# Patient Record
Sex: FEMALE | Race: WHITE | NOT HISPANIC OR LATINO | URBAN - METROPOLITAN AREA
[De-identification: names, ages, dates, MRNs, and addresses within clinical notes are randomized per-mention and may not be internally consistent; named-entity substitution may affect disease eponyms.]

---

## 2018-09-17 RX ORDER — AMOXICILLIN 250 MG/5ML
1 SUSPENSION, RECONSTITUTED, ORAL (ML) ORAL
Qty: 0 | Refills: 0 | COMMUNITY
Start: 2018-09-17 | End: 2018-09-27

## 2018-09-19 ENCOUNTER — INPATIENT (INPATIENT)
Facility: HOSPITAL | Age: 43
LOS: 1 days | Discharge: HOME | DRG: 103 | End: 2018-09-21
Attending: INTERNAL MEDICINE | Admitting: HOSPITALIST
Payer: COMMERCIAL

## 2018-09-19 VITALS — WEIGHT: 134.92 LBS | HEIGHT: 64 IN

## 2018-09-19 DIAGNOSIS — Z98.89 OTHER SPECIFIED POSTPROCEDURAL STATES: Chronic | ICD-10-CM

## 2018-09-19 LAB
ALBUMIN SERPL ELPH-MCNC: 5.2 G/DL — SIGNIFICANT CHANGE UP (ref 3.3–5.2)
ALP SERPL-CCNC: 44 U/L — SIGNIFICANT CHANGE UP (ref 40–120)
ALT FLD-CCNC: 11 U/L — SIGNIFICANT CHANGE UP
ANION GAP SERPL CALC-SCNC: 17 MMOL/L — SIGNIFICANT CHANGE UP (ref 5–17)
AST SERPL-CCNC: 16 U/L — SIGNIFICANT CHANGE UP
BASOPHILS # BLD AUTO: 0 K/UL — SIGNIFICANT CHANGE UP (ref 0–0.2)
BASOPHILS NFR BLD AUTO: 0.5 % — SIGNIFICANT CHANGE UP (ref 0–2)
BILIRUB SERPL-MCNC: 0.4 MG/DL — SIGNIFICANT CHANGE UP (ref 0.4–2)
BUN SERPL-MCNC: 17 MG/DL — SIGNIFICANT CHANGE UP (ref 8–20)
CALCIUM SERPL-MCNC: 10.4 MG/DL — HIGH (ref 8.6–10.2)
CHLORIDE SERPL-SCNC: 100 MMOL/L — SIGNIFICANT CHANGE UP (ref 98–107)
CO2 SERPL-SCNC: 24 MMOL/L — SIGNIFICANT CHANGE UP (ref 22–29)
CREAT SERPL-MCNC: 0.68 MG/DL — SIGNIFICANT CHANGE UP (ref 0.5–1.3)
EOSINOPHIL # BLD AUTO: 0 K/UL — SIGNIFICANT CHANGE UP (ref 0–0.5)
EOSINOPHIL NFR BLD AUTO: 0.7 % — SIGNIFICANT CHANGE UP (ref 0–6)
GLUCOSE SERPL-MCNC: 93 MG/DL — SIGNIFICANT CHANGE UP (ref 70–115)
HCT VFR BLD CALC: 42.3 % — SIGNIFICANT CHANGE UP (ref 37–47)
HGB BLD-MCNC: 14.1 G/DL — SIGNIFICANT CHANGE UP (ref 12–16)
LYMPHOCYTES # BLD AUTO: 2.2 K/UL — SIGNIFICANT CHANGE UP (ref 1–4.8)
LYMPHOCYTES # BLD AUTO: 29.9 % — SIGNIFICANT CHANGE UP (ref 20–55)
MCHC RBC-ENTMCNC: 28.9 PG — SIGNIFICANT CHANGE UP (ref 27–31)
MCHC RBC-ENTMCNC: 33.3 G/DL — SIGNIFICANT CHANGE UP (ref 32–36)
MCV RBC AUTO: 86.7 FL — SIGNIFICANT CHANGE UP (ref 81–99)
MONOCYTES # BLD AUTO: 0.5 K/UL — SIGNIFICANT CHANGE UP (ref 0–0.8)
MONOCYTES NFR BLD AUTO: 6.2 % — SIGNIFICANT CHANGE UP (ref 3–10)
NEUTROPHILS # BLD AUTO: 4.7 K/UL — SIGNIFICANT CHANGE UP (ref 1.8–8)
NEUTROPHILS NFR BLD AUTO: 62.6 % — SIGNIFICANT CHANGE UP (ref 37–73)
PLATELET # BLD AUTO: 311 K/UL — SIGNIFICANT CHANGE UP (ref 150–400)
POTASSIUM SERPL-MCNC: 3.7 MMOL/L — SIGNIFICANT CHANGE UP (ref 3.5–5.3)
POTASSIUM SERPL-SCNC: 3.7 MMOL/L — SIGNIFICANT CHANGE UP (ref 3.5–5.3)
PROT SERPL-MCNC: 8.4 G/DL — SIGNIFICANT CHANGE UP (ref 6.6–8.7)
RBC # BLD: 4.88 M/UL — SIGNIFICANT CHANGE UP (ref 4.4–5.2)
RBC # FLD: 13.5 % — SIGNIFICANT CHANGE UP (ref 11–15.6)
SODIUM SERPL-SCNC: 141 MMOL/L — SIGNIFICANT CHANGE UP (ref 135–145)
TSH SERPL-MCNC: 2.85 UIU/ML — SIGNIFICANT CHANGE UP (ref 0.27–4.2)
WBC # BLD: 7.4 K/UL — SIGNIFICANT CHANGE UP (ref 4.8–10.8)
WBC # FLD AUTO: 7.4 K/UL — SIGNIFICANT CHANGE UP (ref 4.8–10.8)

## 2018-09-19 PROCEDURE — 99285 EMERGENCY DEPT VISIT HI MDM: CPT

## 2018-09-19 PROCEDURE — 70551 MRI BRAIN STEM W/O DYE: CPT | Mod: 26

## 2018-09-19 PROCEDURE — 70544 MR ANGIOGRAPHY HEAD W/O DYE: CPT | Mod: 26,59

## 2018-09-19 PROCEDURE — 70450 CT HEAD/BRAIN W/O DYE: CPT | Mod: 26

## 2018-09-19 PROCEDURE — 70547 MR ANGIOGRAPHY NECK W/O DYE: CPT | Mod: 26

## 2018-09-19 PROCEDURE — 99222 1ST HOSP IP/OBS MODERATE 55: CPT | Mod: GC

## 2018-09-19 PROCEDURE — 93010 ELECTROCARDIOGRAM REPORT: CPT

## 2018-09-19 RX ORDER — SODIUM CHLORIDE 9 MG/ML
1000 INJECTION, SOLUTION INTRAVENOUS ONCE
Qty: 0 | Refills: 0 | Status: COMPLETED | OUTPATIENT
Start: 2018-09-19 | End: 2018-09-19

## 2018-09-19 RX ORDER — SACCHAROMYCES BOULARDII 250 MG
250 POWDER IN PACKET (EA) ORAL
Qty: 0 | Refills: 0 | Status: DISCONTINUED | OUTPATIENT
Start: 2018-09-19 | End: 2018-09-20

## 2018-09-19 RX ORDER — ASPIRIN/CALCIUM CARB/MAGNESIUM 324 MG
81 TABLET ORAL DAILY
Qty: 0 | Refills: 0 | Status: DISCONTINUED | OUTPATIENT
Start: 2018-09-19 | End: 2018-09-20

## 2018-09-19 RX ADMIN — SODIUM CHLORIDE 3000 MILLILITER(S): 9 INJECTION, SOLUTION INTRAVENOUS at 17:27

## 2018-09-19 RX ADMIN — Medication 81 MILLIGRAM(S): at 22:34

## 2018-09-19 NOTE — ED ADULT NURSE REASSESSMENT NOTE - NS ED NURSE REASSESS COMMENT FT1
report given to sandeep salazar in holding room
Pt resting comfortably on stretcher.  No complaints of pain. Pt. states she is still experiencing numbness along the left leg but all other complaints of numbness have almost fully resolved.  Respirations even and unlabored.  Awaiting MRI results.  PIV wnl; flushing without difficulty.  In NAD, will continue to monitor.

## 2018-09-19 NOTE — H&P ADULT - NSHPLABSRESULTS_GEN_ALL_CORE
< from: CT Head No Cont (09.19.18 @ 17:52) >  FINDINGS:  There is no extra-axial intra-axial collection.  There is no evidence for acute infarct, acute hemorrhage, mass effect, or   hydrocephalus.  The gray matter white matter junction is well-preserved.  The visualized portions of the paranasal sinuses and mastoid air cells   are clear.  IMPRESSION:  No acute intracranial pathology. If symptoms persist, consider brain MRI   follow-up if there are no MRI contraindications.  < end of copied text >      < from: MR Angio Head No Cont (09.19.18 @ 23:37) >  IMPRESSION:         Normal head/brain MRA.  ******PRELIMINARY REPORT******    ******PRELIMINARY REPORT******        < end of copied text >      < from: MR Head No Cont (09.19.18 @ 20:15) >  IMPRESSION:   Several small high T2 and flair signal lesions in the deep and   subcortical   white matter of both hemispheres which are nonspecific. Possible   etiologies   include changes related to vasculitis or migraine headache, lyme disease,   early   small vessel ischemic changes or foci of demyelination. Correlate   clinically.  ******PRELIMINARY REPORT******      < end of copied text >

## 2018-09-19 NOTE — ED STATDOCS - OBJECTIVE STATEMENT
Patient is a 43 year old F with a PMHx of vertigo and migraines who presents to the ED complaining of L sided facial numbness that started today.  Also reports she has had on/off left leg numbness for a few years and numbness from the L elbow down, worse on ulnar aspect, for about a week.  Notes that she has history bursitis and sciatica, so the leg numbness is normal.  Also notes she has had L arm numbness in the past.  States that she has seen a neurologist for her migraines and vertigo, but not for her numbness.  Of note, patient was dx with a sinus infection within the last week and is currently on antibiotics.  States that she has a history of developing migraines when she has infections.  Denies any weakness to arms or legs, slurred speech, facial droop, abnormal HAs, dizziness or other complaints.  Denies f/c/n/v/cp/sob/palpitations/cough/rash/abd.pain/d/c/dysuria/hematuria.  No recent travel or long car/plane rides. Patient is a 43 year old F with a PMHx of vertigo and migraines who presents to the ED complaining of L sided facial numbness that started today.  Also reports she has had on/off left leg numbness for a few years and numbness from the L elbow down, worse on ulnar aspect, for about a week.  Notes that she has history bursitis and sciatica.  Also notes she has had L arm numbness in the past.  States that she has seen a neurologist for her migraines and vertigo, but not for her numbness.  Of note, patient was dx with a sinus infection within the last week and is currently on antibiotics.  States that she has a history of developing migraines when she has infections.  Denies any weakness to arms or legs, slurred speech, facial droop, abnormal HAs, dizziness or other complaints.  Denies f/c/n/v/cp/sob/palpitations/cough/rash/abd.pain/d/c/dysuria/hematuria.  No recent travel or long car/plane rides.

## 2018-09-19 NOTE — H&P ADULT - FAMILY HISTORY
Grandparent  Still living? Unknown  Family history of stroke, Age at diagnosis: Age Unknown     Mother  Still living? Unknown  Family history of ovarian cancer, Age at diagnosis: Age Unknown     Father  Still living? Unknown  Family history of testicular cancer, Age at diagnosis: Age Unknown  Family history of pancreatic cancer, Age at diagnosis: Age Unknown

## 2018-09-19 NOTE — ED STATDOCS - PROGRESS NOTE DETAILS
MRI nonspecific spoke to Dr. Zuluaga neurology will admit for MRA head and neck neuro to see pt in the am

## 2018-09-19 NOTE — ED STATDOCS - NEUROLOGICAL, MLM
sensation is normal and strength is normal. CN 2-12 intact, strength 5/5 upper and lower extremities.  finger to nose intact

## 2018-09-19 NOTE — ED ADULT NURSE NOTE - NSIMPLEMENTINTERV_GEN_ALL_ED
Implemented All Universal Safety Interventions:  [employer] [facility] to call system. Call bell, personal items and telephone within reach. Instruct patient to call for assistance. Room bathroom lighting operational. Non-slip footwear when patient is off stretcher. Physically safe environment: no spills, clutter or unnecessary equipment. Stretcher in lowest position, wheels locked, appropriate side rails in place.

## 2018-09-19 NOTE — H&P ADULT - NSHPREVIEWOFSYSTEMS_GEN_ALL_CORE
Denies fevers, chills, nausea, vomiting, headaches, chest pain, sob, palpitations, abdominal pain, diarrhea, dysuria, rashes.

## 2018-09-19 NOTE — ED STATDOCS - MEDICAL DECISION MAKING DETAILS
Possible atypical migraine, in the setting of uri.  Patient states she normally gets migraines with uri.  Will follow labs, CT and MRI of brain.  If negative, will d/c with neurology follow up. Possible atypical migraine, in the setting of uri vs MS.  Patient states she normally gets migraines with uri but no ha this time.  Will follow labs, CT and MRI of brain.  If negative, will d/c with neurology follow up.

## 2018-09-19 NOTE — H&P ADULT - NSHPPHYSICALEXAM_GEN_ALL_CORE
Vital Signs Last 24 Hrs  T(C): 37 (20 Sep 2018 00:43), Max: 37.2 (19 Sep 2018 21:41)  T(F): 98.6 (20 Sep 2018 00:43), Max: 99 (19 Sep 2018 21:41)  HR: 88 (20 Sep 2018 00:43) (88 - 112)  BP: 137/78 (20 Sep 2018 00:43) (137/78 - 163/94)  RR: 16 (20 Sep 2018 00:43) (16 - 16)  SpO2: 100% (20 Sep 2018 00:43) (100% - 100%)    General: NAD, resting comfortably in bed  HEENT: NC/AT, PERRLA 3-4mm b/l, EOMI, throat non-erythematous, MMM, no sinus tenderness  Neck: supple, no lymphadenopathy  Resp: CTA bilaterally, no crackles or wheezes  Cardio: S1S2+, RRR, no murmurs  Abdomen: soft, non-distended, BS+ normoactive, non-tender  Vascular: no peripheral edema, DP pulses 2+ b/l  MSK: FROM in all extremities  Neuro: AAOx3, CN 2-12 grossly intact. Sensation grossly intact in all extremities, 5/5 strength in all extremities  Skin: warm and dry, normal color  Psych: normal mood and affect, pleasant, cooperative

## 2018-09-19 NOTE — H&P ADULT - ASSESSMENT
44 y/o female with hx of left sided sciatica, migraines and vertigo, presents with complaints of left facial numbness since yesterday and left arm numbness x 1 week. 46 y/o female with hx of left sided sciatica, migraines w/o aura, and vertigo, presents with complaints of left facial numbness since yesterday and left arm numbness x 1 week.     Left facial numbness  -unknown etiology  -MRI head findings with several small high T2 and flair signal lesions in the deep and subcortical white matter of both hemispheres, concerning for demyelination process vs migraine headaches vs lyme vs vasculitis  -lyme studies  -neuro consult  -possible LP to assess csf oligoclonal bands    Sinusitis  -c/w amoxicillin     Preventive measure  -vcd boots  -low risk pt not needing pharmacologic AC at this time 46 y/o female with hx of left sided sciatica, migraines w/o aura, and vertigo, presents with complaints of left facial numbness since yesterday and left arm numbness x 1 week.     Left facial numbness  -unknown etiology  -MRI head findings with several small high T2 and flair signal lesions in the deep and subcortical white matter of both hemispheres, concerning for demyelination process vs migraine headaches vs lyme vs vasculitis  -MRA head and neck prelim negative  -lyme studies  -neuro consult  -possible LP to assess csf oligoclonal bands    Preventive measure  -vcd boots  -low risk pt not needing pharmacologic AC at this time 46 y/o female with hx of left sided sciatica, migraines w/o aura, and vertigo, presents with complaints of left facial numbness since yesterday and left arm numbness x 1 week.     Left facial numbness  -unknown etiology  -MRI head findings with several small high T2 and flair signal lesions in the deep and subcortical white matter of both hemispheres, concerning for demyelination process vs migraine headaches vs lyme vs vasculitis  -MRA head and neck prelim negative  -lyme studies  -neuro consult  -possible LP to assess csf oligoclonal bands    Sinusitis  -was given amoxicillin 2 days ago for sinusitis that was diagnosed at an urgent care  -currently asymptomatic and imaging studies negative, so will d/c abx    Preventive measure  -vcd boots  -low risk pt not needing pharmacologic AC at this time 46 y/o female with hx of left sided sciatica, migraines w/o aura, and vertigo, presents with complaints of left facial numbness since yesterday and left arm numbness x 1 week.     Left facial numbness  -unknown etiology  -MRI head findings with several small high T2 and flair signal lesions in the deep and subcortical white matter of both hemispheres, concerning for demyelination process vs migraine headaches vs lyme vs vasculitis  -MRA head and neck prelim negative  -f/u lyme studies and CATHI  -neuro consult  -possible LP to assess csf oligoclonal bands    Sinusitis  -was given amoxicillin 2 days ago for sinusitis that was diagnosed at an urgent care  -currently asymptomatic and imaging studies negative, so will d/c abx    Preventive measure  -vcd boots  -low risk pt not needing pharmacologic AC at this time

## 2018-09-19 NOTE — H&P ADULT - HISTORY OF PRESENT ILLNESS
Patient is a 46 yo F with a pmh of sciatica, migraines and vertigowho presented to ED c/o left facial numbness that started this  morning. Patient reports intermittent numbness of left upper and lower extremities over the past 1 week.....    Patient was diagnosed with sinusitis on Monday for which she has been taking amoxicillin. 44 y/o female with hx of left sided sciatica, migraines and vertigo, presents with complaints of left facial numbness since yesterday. Also reports left arm numbness for the past week. Numbness is constant, but severity waxes and wanes. No exacerbating or relieving factors noted. Denies speech impairment, difficulties swallowing, facial droop, motor deficits, vision changes or headaches. No prior events. No sick contacts, recent travel, camping, tick bites or rashes.     Patient was diagnosed with sinusitis on Monday at urgent care center for which she has been taking amoxicillin. 46 y/o female with hx of left sided sciatica, migraines and vertigo, presents with complaints of left facial numbness since yesterday. Also reports left arm numbness for the past week. Numbness is constant, but severity waxes and wanes. No exacerbating or relieving factors noted. She also has intermittent left leg numbness from her Sciatica for the past 10 years. Denies speech impairment, difficulties swallowing, facial droop, motor deficits, vision changes or headaches. No prior events. No sick contacts, recent travel, camping, tick bites or rashes.     Patient was diagnosed with sinusitis on Monday at urgent care center for which she has been taking amoxicillin. 44 y/o female with hx of left sided sciatica, migraines w/o aura, and vertigo, presents with complaints of left facial numbness since yesterday. Also reports left arm numbness for the past week. Numbness is constant, but severity waxes and wanes. No exacerbating or relieving factors noted. She also has intermittent left leg numbness from her Sciatica for the past 10 years. Denies speech impairment, difficulties swallowing, facial droop, motor deficits, vision changes or headaches. No prior events. No sick contacts, recent travel, camping, tick bites or rashes.     Patient was diagnosed with sinusitis on Monday at urgent care center for which she has been taking amoxicillin. 46 y/o female with hx of left sided sciatica, migraines w/o aura, and vertigo, presents with complaints of left facial numbness since yesterday. Also reports left arm numbness for the past week. Numbness is constant, but severity waxes and wanes. No exacerbating or relieving factors noted. She also has intermittent left leg numbness from her Sciatica for the past 10 years. Denies speech impairment, difficulties swallowing, facial droop, motor deficits, vision changes or headaches. No prior events. No sick contacts, recent travel, camping, tick bites or rashes. No family hx of lupus or other autoimmune diseases.    Patient was diagnosed with sinusitis on Monday at urgent care center for which she has been taking amoxicillin.

## 2018-09-19 NOTE — ED ADULT NURSE NOTE - OBJECTIVE STATEMENT
44 y/o female presents with paresthesia to the left ear, arm, and leg x 1 week. Pt. has olga of vertigo and allergies to Bactrim. Pt. denies any weakness, HA, change in vision, fever, chills, N/V.

## 2018-09-20 DIAGNOSIS — R20.0 ANESTHESIA OF SKIN: ICD-10-CM

## 2018-09-20 LAB
ANION GAP SERPL CALC-SCNC: 14 MMOL/L — SIGNIFICANT CHANGE UP (ref 5–17)
BUN SERPL-MCNC: 13 MG/DL — SIGNIFICANT CHANGE UP (ref 8–20)
CALCIUM SERPL-MCNC: 9.6 MG/DL — SIGNIFICANT CHANGE UP (ref 8.6–10.2)
CHLORIDE SERPL-SCNC: 104 MMOL/L — SIGNIFICANT CHANGE UP (ref 98–107)
CO2 SERPL-SCNC: 24 MMOL/L — SIGNIFICANT CHANGE UP (ref 22–29)
CREAT SERPL-MCNC: 0.73 MG/DL — SIGNIFICANT CHANGE UP (ref 0.5–1.3)
GLUCOSE SERPL-MCNC: 93 MG/DL — SIGNIFICANT CHANGE UP (ref 70–115)
POTASSIUM SERPL-MCNC: 3.9 MMOL/L — SIGNIFICANT CHANGE UP (ref 3.5–5.3)
POTASSIUM SERPL-SCNC: 3.9 MMOL/L — SIGNIFICANT CHANGE UP (ref 3.5–5.3)
SODIUM SERPL-SCNC: 142 MMOL/L — SIGNIFICANT CHANGE UP (ref 135–145)

## 2018-09-20 PROCEDURE — 99233 SBSQ HOSP IP/OBS HIGH 50: CPT | Mod: GC

## 2018-09-20 PROCEDURE — 93306 TTE W/DOPPLER COMPLETE: CPT | Mod: 26

## 2018-09-20 RX ORDER — ACETAMINOPHEN 500 MG
650 TABLET ORAL EVERY 6 HOURS
Qty: 0 | Refills: 0 | Status: DISCONTINUED | OUTPATIENT
Start: 2018-09-20 | End: 2018-09-21

## 2018-09-20 RX ORDER — ASPIRIN/CALCIUM CARB/MAGNESIUM 324 MG
81 TABLET ORAL DAILY
Qty: 0 | Refills: 0 | Status: DISCONTINUED | OUTPATIENT
Start: 2018-09-20 | End: 2018-09-21

## 2018-09-20 RX ADMIN — Medication 650 MILLIGRAM(S): at 08:30

## 2018-09-20 RX ADMIN — Medication 650 MILLIGRAM(S): at 20:02

## 2018-09-20 RX ADMIN — Medication 650 MILLIGRAM(S): at 07:43

## 2018-09-20 RX ADMIN — Medication 650 MILLIGRAM(S): at 19:32

## 2018-09-20 NOTE — PROGRESS NOTE ADULT - ASSESSMENT
46 y/o female with hx of left sided sciatica, migraines w/o aura, and vertigo, presents with complaints of left facial numbness since yesterday and left arm numbness x 1 week.     Left facial numbness  -unknown etiology  -MRI head findings with several small high T2 and flair signal lesions in the deep and subcortical white matter of both hemispheres, concerning for demyelination process vs migraine headaches vs lyme vs vasculitis  -MRA head and neck prelim negative  -f/u lyme studies and CATHI  -neuro consult  -possible LP to assess csf oligoclonal bands    Sinusitis  -was given amoxicillin 2 days ago for sinusitis that was diagnosed at an urgent care  -currently asymptomatic and imaging studies negative, so will d/c abx    Preventive measure  -vcd boots  -low risk pt not needing pharmacologic AC at this time

## 2018-09-20 NOTE — PROGRESS NOTE ADULT - SUBJECTIVE AND OBJECTIVE BOX
Patient is a 43y old  Female who presents with a chief complaint of Left-sided facial numbness (19 Sep 2018 23:12)    Overnight/AM Events:  Patient seen and examined at bedside. No acute overnight events. She reports persistent left sided facial and upper extremity numbness, but reduced in intensity. No motor or speech deficits. She also complains of a mild frontal headache.     ROS: Denies fevers, chills, nausea, vomiting, chest pain, sob, palpitations, abdominal pain, diarrhea, dysuria. All other ROS negative.     Vital Signs Last 24 Hrs  T(C): 36.8 (20 Sep 2018 05:04), Max: 37.2 (19 Sep 2018 21:41)  T(F): 98.2 (20 Sep 2018 05:04), Max: 99 (19 Sep 2018 21:41)  HR: 87 (20 Sep 2018 05:04) (87 - 112)  BP: 131/78 (20 Sep 2018 05:04) (131/78 - 163/94)  RR: 16 (20 Sep 2018 05:04) (16 - 16)  SpO2: 99% (20 Sep 2018 05:04) (99% - 100%)    Physical Exam:  General: NAD, resting comfortably in bed  HEENT: NC/AT, PERRLA 3-4mm b/l, EOMI, throat non-erythematous, MMM, no sinus tenderness  Neck: supple, no lymphadenopathy  Resp: CTA bilaterally, no crackles or wheezes  Cardio: S1S2+, RRR, no murmurs  Abdomen: soft, non-distended, BS+ normoactive, non-tender  Vascular: no peripheral edema, DP pulses 2+ b/l  MSK: FROM in all extremities  Neuro: AAOx3, CN 2-12 grossly intact. Sensation grossly intact in all extremities, 5/5 strength in all extremities  Skin: warm and dry, normal color  Psych: normal mood and affect, pleasant, cooperative    Labs:                        14.1   7.4   )-----------( 311      ( 19 Sep 2018 17:47 )             42.3   09-19    141  |  100  |  17.0  ----------------------------<  93  3.7   |  24.0  |  0.68    Ca    10.4<H>      19 Sep 2018 17:47    TPro  8.4  /  Alb  5.2  /  TBili  0.4  /  DBili  x   /  AST  16  /  ALT  11  /  AlkPhos  44  09-19      MEDICATIONS  (STANDING):    MEDICATIONS  (PRN):  acetaminophen   Tablet .. 650 milliGRAM(s) Oral every 6 hours PRN Mild Pain (1 - 3)

## 2018-09-20 NOTE — CONSULT NOTE ADULT - SUBJECTIVE AND OBJECTIVE BOX
CHIEF COMPLAINT: left face numb    HPI: 43yFemale  46 y/o female with hx of left sided sciatica, migraines w/o aura, and vertigo, presents with complaints of left facial numbness since yesterday. Also reports left arm numbness for the past week. Numbness is constant, but severity waxes and wanes. No exacerbating or relieving factors noted. She also has intermittent left leg numbness from her Sciatica for the past 10 years. Denies speech impairment, difficulties swallowing, facial droop, motor deficits, vision changes or headaches. No prior events. No sick contacts, recent travel, camping, tick bites or rashes. No family hx of lupus or other autoimmune diseases.    Patient was diagnosed with sinusitis on Monday at urgent care center for which she has been taking amoxicillin.     This morning the only numbness ( and Pain) is behind the leg- chronic sciatica.  No h/o miscarriage, PE or DVT.    PAST MEDICAL & SURGICAL HISTORY:  Migraines  Endometriosis  Vertigo  S/P  section: x3  S/P laparoscopic surgery: Endometriosis    MEDICATIONS  (STANDING):    MEDICATIONS  (PRN):  acetaminophen   Tablet .. 650 milliGRAM(s) Oral every 6 hours PRN Mild Pain (1 - 3)    Allergies    Bactrim (Rash)    Intolerances        FAMILY HISTORY:  Family history of pancreatic cancer (Father)  Family history of testicular cancer (Father)  Family history of ovarian cancer (Mother)  Family history of stroke (Grandparent): stroke at age &gt;70yrs  Migraine  Grandmother had PE        SOCIAL HISTORY:    Tobacco:  no  Alcohol:  no  Drugs:  no        REVIEW OF SYSTEMS:    Relevant systems are negative except as noted in the chart, HPI, and PMH      VITAL SIGNS:  Vital Signs Last 24 Hrs  T(C): 37.4 (20 Sep 2018 08:13), Max: 37.4 (20 Sep 2018 08:13)  T(F): 99.3 (20 Sep 2018 08:13), Max: 99.3 (20 Sep 2018 08:13)  HR: 83 (20 Sep 2018 08:13) (83 - 112)  BP: 128/82 (20 Sep 2018 08:13) (128/82 - 163/94)  BP(mean): --  RR: 17 (20 Sep 2018 08:13) (16 - 17)  SpO2: 99% (20 Sep 2018 08:13) (99% - 100%)    PHYSICAL EXAMINATION:    General: Well-developed, well nourished, in no acute distress.  Cardiac:  Regular rate and rhythm. No carotid bruits appreciated.  Eyes: Fundoscopic examination was deferred.  Neurologic:  - Mental Status:  Alert, awake, oriented to person, place, and time; Speech is fluent. Language is normal. Follows commands well.  Insight and knowledge appear appropriate.  Cranial Nerves II-XII:    II:  Visual acuity is normal for age ; Visual fields are full to confrontation; Pupils are equal, round, and reactive to light.  III, IV, VI:  Extraocular movements are intact without nystagmus.  V:  Facial sensation is intact in the V1-V3 distribution bilaterally.  VII:  Face is symmetric with normal eye closure and smile  VIII:  Hearing is grossly intact  IX, X, XII:  speech is clear  XI:  Head turning and shoulder shrug are intact.  - Motor:  Strength is 5/5 x 4.   There is no pronator drift. .  - Reflexes:  2+ and symmetric at the knees.  Plantar responses flexor.  - Sensory:  Symmetric to light touch  - Coordination:  Finger-nose-finger is normal. Rapid alternating hand and foot  movements are intact. Dexterity appears normal      LABS:                          14.1   7.4   )-----------( 311      ( 19 Sep 2018 17:47 )             42.3     20 Sep 2018 06:38    142    |  104    |  13.0   ----------------------------<  93     3.9     |  24.0   |  0.73     Ca    9.6        20 Sep 2018 06:38    TPro  8.4    /  Alb  5.2    /  TBili  0.4    /  DBili  x      /  AST  16     /  ALT  11     /  AlkPhos  44     19 Sep 2018 17:47    LIVER FUNCTIONS - ( 19 Sep 2018 17:47 )  Alb: 5.2 g/dL / Pro: 8.4 g/dL / ALK PHOS: 44 U/L / ALT: 11 U/L / AST: 16 U/L / GGT: x                 RADIOLOGY & ADDITIONAL STUDIES:    MR brain - mild microvascular changes  MRA head and neck - normal    IMPRESSION:    Suspect migraine., No obvious risk factors for stroke/TIA, Seizure. May be subjective symptom from sunus disease.  Vascular studies are negative.       PLAN:  1. Medical and Cardiac evaluation and treatment as indicated- Needs TTE and probably JETT  2. Hypercoag work up -can be done as outpatient  3. ASA 81mg  4.  5.

## 2018-09-21 ENCOUNTER — TRANSCRIPTION ENCOUNTER (OUTPATIENT)
Age: 43
End: 2018-09-21

## 2018-09-21 VITALS
TEMPERATURE: 98 F | HEART RATE: 76 BPM | OXYGEN SATURATION: 99 % | DIASTOLIC BLOOD PRESSURE: 74 MMHG | SYSTOLIC BLOOD PRESSURE: 119 MMHG

## 2018-09-21 LAB — ANA TITR SER: NEGATIVE — SIGNIFICANT CHANGE UP

## 2018-09-21 PROCEDURE — 70544 MR ANGIOGRAPHY HEAD W/O DYE: CPT

## 2018-09-21 PROCEDURE — 86618 LYME DISEASE ANTIBODY: CPT

## 2018-09-21 PROCEDURE — 70547 MR ANGIOGRAPHY NECK W/O DYE: CPT

## 2018-09-21 PROCEDURE — 93970 EXTREMITY STUDY: CPT

## 2018-09-21 PROCEDURE — 80053 COMPREHEN METABOLIC PANEL: CPT

## 2018-09-21 PROCEDURE — 93325 DOPPLER ECHO COLOR FLOW MAPG: CPT

## 2018-09-21 PROCEDURE — 85027 COMPLETE CBC AUTOMATED: CPT

## 2018-09-21 PROCEDURE — 93306 TTE W/DOPPLER COMPLETE: CPT

## 2018-09-21 PROCEDURE — 99239 HOSP IP/OBS DSCHRG MGMT >30: CPT

## 2018-09-21 PROCEDURE — 93312 ECHO TRANSESOPHAGEAL: CPT

## 2018-09-21 PROCEDURE — 70450 CT HEAD/BRAIN W/O DYE: CPT

## 2018-09-21 PROCEDURE — 93320 DOPPLER ECHO COMPLETE: CPT

## 2018-09-21 PROCEDURE — 84443 ASSAY THYROID STIM HORMONE: CPT

## 2018-09-21 PROCEDURE — 86038 ANTINUCLEAR ANTIBODIES: CPT

## 2018-09-21 PROCEDURE — 36415 COLL VENOUS BLD VENIPUNCTURE: CPT

## 2018-09-21 PROCEDURE — 84702 CHORIONIC GONADOTROPIN TEST: CPT

## 2018-09-21 PROCEDURE — 80048 BASIC METABOLIC PNL TOTAL CA: CPT

## 2018-09-21 PROCEDURE — 93970 EXTREMITY STUDY: CPT | Mod: 26

## 2018-09-21 PROCEDURE — 93005 ELECTROCARDIOGRAM TRACING: CPT

## 2018-09-21 PROCEDURE — 99285 EMERGENCY DEPT VISIT HI MDM: CPT

## 2018-09-21 PROCEDURE — 70551 MRI BRAIN STEM W/O DYE: CPT

## 2018-09-21 RX ORDER — ASPIRIN/CALCIUM CARB/MAGNESIUM 324 MG
1 TABLET ORAL
Qty: 30 | Refills: 0 | OUTPATIENT
Start: 2018-09-21 | End: 2018-10-20

## 2018-09-21 RX ORDER — ASPIRIN/CALCIUM CARB/MAGNESIUM 324 MG
1 TABLET ORAL
Qty: 30 | Refills: 0
Start: 2018-09-21 | End: 2018-10-20

## 2018-09-21 RX ORDER — ACETAMINOPHEN 500 MG
2 TABLET ORAL
Qty: 0 | Refills: 0 | DISCHARGE
Start: 2018-09-21

## 2018-09-21 RX ORDER — ASPIRIN/CALCIUM CARB/MAGNESIUM 324 MG
325 TABLET ORAL DAILY
Qty: 0 | Refills: 0 | Status: DISCONTINUED | OUTPATIENT
Start: 2018-09-21 | End: 2018-09-21

## 2018-09-21 RX ORDER — SODIUM CHLORIDE 9 MG/ML
1000 INJECTION INTRAMUSCULAR; INTRAVENOUS; SUBCUTANEOUS
Qty: 0 | Refills: 0 | Status: DISCONTINUED | OUTPATIENT
Start: 2018-09-21 | End: 2018-09-21

## 2018-09-21 RX ADMIN — Medication 325 MILLIGRAM(S): at 16:51

## 2018-09-21 NOTE — CONSULT NOTE ADULT - SUBJECTIVE AND OBJECTIVE BOX
[facility], THE HEART CENTER                                   [address] [place] [address]                                                      PHONE: [phone]                                                         FAX: [phone]  [email or web address]  ---------------------------------------------------------------------------------------------------------------------------------    HPI:  TANO JULIEN is an 43y Female PMHX Migrains, Endometriosis, who presentes to [facility] ED with Left sided facial weakness.  Pt states she developed sudden onset left sided facial numbness that waxes and wanes.  She does mention recently being diagnosed with sinusitis.  Cardiology consulted as pt found to have PFO.        PAST MEDICAL & SURGICAL HISTORY:  Migraines  Endometriosis  Vertigo  S/P  section: x3  S/P laparoscopic surgery: Endometriosis      Bactrim (Rash)      MEDICATIONS  (STANDING):  aspirin enteric coated 81 milliGRAM(s) Oral daily    MEDICATIONS  (PRN):  acetaminophen   Tablet .. 650 milliGRAM(s) Oral every 6 hours PRN Mild Pain (1 - 3)      Family History: Pt denies hx of early cad, SCD, or congenital heart disease.      Social History:  Cigarettes:     no               Alchohol:           no      Illicit Drug Abuse:  no    ROS:  Constitutional: No fever, weight loss or fatigue  Eyes: No eye pain, visual disturbances, or discharge  ENMT:  No difficulty hearing, tinnitus, vertigo; No sinus or throat pain  Neck: No pain or stiffness  Respiratory: No cough, wheezing, chills or hemoptysis  Cardiovascular: No chest pain, palpitations, shortness of breath, dizziness or leg swelling  Gastrointestinal: No abdominal or epigastric pain. No nausea, vomiting or hematemesis; No diarrhea or constipation. No melena or hematochezia.  Genitourinary: No dysuria, frequency, hematuria or incontinence  Rectal: No pain, hemorrhoids or incontinence  Neurological: No headaches, memory loss, loss of strength, numbness or tremors  Skin: No itching, burning, rashes or lesions   Lymph Nodes: No enlarged glands  Endocrine: No heat or cold intolerance; No hair loss  Musculoskeletal: No joint pain or swelling; No muscle, back or extremity pain  Psychiatric: No depression, anxiety, mood swings or difficulty sleeping  Heme/Lymph: No easy bruising or bleeding gums  Allergy and Immunologic: No hives or eczema    Vital Signs Last 24 Hrs  T(C): 36.8 (21 Sep 2018 07:45), Max: 37.7 (20 Sep 2018 17:00)  T(F): 98.2 (21 Sep 2018 07:45), Max: 99.9 (20 Sep 2018 17:00)  HR: 78 (21 Sep 2018 07:45) (71 - 90)  BP: 113/73 (21 Sep 2018 07:45) (112/70 - 117/69)  BP(mean): --  RR: 17 (21 Sep 2018 07:45) (16 - 18)  SpO2: 98% (21 Sep 2018 07:45) (97% - 98%)  ICU Vital Signs Last 24 Hrs  TANO JULIEN  I&O's Detail    I&O's Summary    Drug Dosing Weight  TANO JULIEN      PHYSICAL EXAM:  General: Appears well developed, well nourished alert and cooperative.  HEENT: Head; normocephalic, atraumatic.  Eyes: Pupils reactive, cornea wnl.  Neck: Supple, no nodes adenopathy, no NVD or carotid bruit or thyromegaly.  CARDIOVASCULAR: Normal S1 and S2, No murmur, rub, gallop or lift.   LUNGS: No rales, rhonchi or wheeze. Normal breath sounds bilaterally.  ABDOMEN: Soft, nontender without mass or organomegaly. bowel sounds normoactive.  EXTREMITIES: No clubbing, cyanosis or edema. Distal pulses wnl.   SKIN: warm and dry with normal turgor.  NEURO: Alert/oriented x 3/normal motor exam. No pathologic reflexes.    PSYCH: normal affect.        LABS:                        14.1   7.4   )-----------( 311      ( 19 Sep 2018 17:47 )             42.3     09-20    142  |  104  |  13.0  ----------------------------<  93  3.9   |  24.0  |  0.73    Ca    9.6      20 Sep 2018 06:38    TPro  8.4  /  Alb  5.2  /  TBili  0.4  /  DBili  x   /  AST  16  /  ALT  11  /  AlkPhos  44  09-19    TANO JULIEN     RADIOLOGY & ADDITIONAL STUDIES:    INTERPRETATION OF TELEMETRY (personally reviewed):    ECG:  nsr, nml axis, no st elevations/depressions     ECHO: 18    Summary:   1. Technically good study.   2. Normal global left ventricular systolic function.   3. Left ventricular ejection fraction, by visual estimation, is 65 to   70%.   4. Trace pulmonic valve regurgitation.   5. Trace tricuspid regurgitation.   6. There is evidence of right to left shunt with use of agitated saline.   This is most likely due to a PFO. Recommend further testing such as JETT   for further evaluation.         Assessment and Plan:  In summary, TANO JULIEN is an 43y Female with past medical history significant for       Thank you for allowing [facility] to participate in the care of this patient.  Please feel free to call with any questions or concerns. [facility], THE HEART CENTER                                   [address] [place] [address]                                                      PHONE: [phone]                                                         FAX: [phone]  [email or web address]  ---------------------------------------------------------------------------------------------------------------------------------    HPI:  TANO JULIEN is an 43y Female PMHX Migrains, Endometriosis, who presentes to [facility] ED with Left sided facial weakness.  Pt states she developed sudden onset left sided facial numbness that waxes and wanes.  She does mention recently being diagnosed with sinusitis.  Cardiology consulted as pt found to have PFO.  Pt still has residual left LE numbness.       PAST MEDICAL & SURGICAL HISTORY:  Migraines  Endometriosis  Vertigo  S/P  section: x3  S/P laparoscopic surgery: Endometriosis      Bactrim (Rash)      MEDICATIONS  (STANDING):  aspirin enteric coated 81 milliGRAM(s) Oral daily    MEDICATIONS  (PRN):  acetaminophen   Tablet .. 650 milliGRAM(s) Oral every 6 hours PRN Mild Pain (1 - 3)      Family History: Pt denies hx of early cad, SCD, or congenital heart disease.      Social History:  Cigarettes:     no               Alchohol:           no      Illicit Drug Abuse:  no    ROS:  Constitutional: No fever, weight loss or fatigue  Eyes: No eye pain, visual disturbances, or discharge  ENMT:  No difficulty hearing, tinnitus, vertigo; No sinus or throat pain  Neck: No pain or stiffness  Respiratory: No cough, wheezing, chills or hemoptysis  Cardiovascular: No chest pain, palpitations, shortness of breath, dizziness or leg swelling  Gastrointestinal: No abdominal or epigastric pain. No nausea, vomiting or hematemesis; No diarrhea or constipation. No melena or hematochezia.  Genitourinary: No dysuria, frequency, hematuria or incontinence  Rectal: No pain, hemorrhoids or incontinence  Neurological: No headaches, memory loss, loss of strength, numbness or tremors  Skin: No itching, burning, rashes or lesions   Lymph Nodes: No enlarged glands  Endocrine: No heat or cold intolerance; No hair loss  Musculoskeletal: No joint pain or swelling; No muscle, back or extremity pain  Psychiatric: No depression, anxiety, mood swings or difficulty sleeping  Heme/Lymph: No easy bruising or bleeding gums  Allergy and Immunologic: No hives or eczema    Vital Signs Last 24 Hrs  T(C): 36.8 (21 Sep 2018 07:45), Max: 37.7 (20 Sep 2018 17:00)  T(F): 98.2 (21 Sep 2018 07:45), Max: 99.9 (20 Sep 2018 17:00)  HR: 78 (21 Sep 2018 07:45) (71 - 90)  BP: 113/73 (21 Sep 2018 07:45) (112/70 - 117/69)  BP(mean): --  RR: 17 (21 Sep 2018 07:45) (16 - 18)  SpO2: 98% (21 Sep 2018 07:45) (97% - 98%)  ICU Vital Signs Last 24 Hrs  TANO JULIEN  I&O's Detail    I&O's Summary    Drug Dosing Weight  TANO JULIEN      PHYSICAL EXAM:  General: Appears well developed, well nourished alert and cooperative.  HEENT: Head; normocephalic, atraumatic.  Eyes: Pupils reactive, cornea wnl.  Neck: Supple, no nodes adenopathy, no NVD or carotid bruit or thyromegaly.  CARDIOVASCULAR: Normal S1 and S2, No murmur, rub, gallop or lift.   LUNGS: No rales, rhonchi or wheeze. Normal breath sounds bilaterally.  ABDOMEN: Soft, nontender without mass or organomegaly. bowel sounds normoactive.  EXTREMITIES: No clubbing, cyanosis or edema. Distal pulses wnl.   SKIN: warm and dry with normal turgor.  NEURO: Alert/oriented x 3/normal motor exam. No pathologic reflexes.    PSYCH: normal affect.        LABS:                        14.1   7.4   )-----------( 311      ( 19 Sep 2018 17:47 )             42.3     09-20    142  |  104  |  13.0  ----------------------------<  93  3.9   |  24.0  |  0.73    Ca    9.6      20 Sep 2018 06:38    TPro  8.4  /  Alb  5.2  /  TBili  0.4  /  DBili  x   /  AST  16  /  ALT  11  /  AlkPhos  44      TANO JULIEN     RADIOLOGY & ADDITIONAL STUDIES:    INTERPRETATION OF TELEMETRY (personally reviewed):    ECG:  nsr, nml axis, no st elevations/depressions     ECHO: 18    Summary:   1. Technically good study.   2. Normal global left ventricular systolic function.   3. Left ventricular ejection fraction, by visual estimation, is 65 to   70%.   4. Trace pulmonic valve regurgitation.   5. Trace tricuspid regurgitation.   6. There is evidence of right to left shunt with use of agitated saline.   This is most likely due to a PFO. Recommend further testing such as JETT   for further evaluation.        Assessment and Plan:  In summary,  TANO JULIEN is an 43y Female PMHX Migrains, Endometriosis, who presentes to [facility] ED with Left sided facial weakness.  Pt states she developed sudden onset left sided facial numbness that waxes and wanes.  She does mention recently being diagnosed with sinusitis.  Cardiology consulted as pt found to have PFO.  Pt still has residual left LE numbness.     PFO  -hypercoaguable work up as per Heme  -asa 325 mg daily  -pt npo- last meal last night before midnight  -Plan for JETT today    Thank you for allowing [facility] to participate in the care of this patient.  Please feel free to call with any questions or concerns.

## 2018-09-21 NOTE — DISCHARGE NOTE ADULT - PLAN OF CARE
Control You had left sided numbness which is likely secondary to migraine headaches with aura. Migraines can have unusual symptoms such as the ones you experienced. The MRI studies of your head were negative meaning that no stroke or other abnormality was found to explain your symptoms. Migraines can make you more susceptible to having strokes and transient ischemia, so it is important that you take full dose aspirin as an antiplatelet prophylaxis. Please follow up with Dr Alvarez within one week and get hypercoagulable studies. Further evaluation and possible closure You have a patent foramen ovale which is a hole between your R and L atria in your heart that usually closes shortly after birth. Yours did not fully close. As such, it allows some blood and potentially clots to flow from the right side of your heart to the left side. A clot could flow through and get lodged in your brain (a stroke), your heart (an MI) or elsewhere in your body. The chance is small but greater than 0%. The doppler study of your legs was negative for deep vein thromboses which if they existed could potentially throw a clot. Neurology recommends closing the hole. Please take aspirin as an antiplatelet prophylaxis and follow up with your cardiologist within a week for further evaluation. Follow up outpatient with your general practitioner and treat headaches as you have been doing with tylenol as needed. You have a patent foramen ovale which is a hole between your Right and Left atria in your heart that usually closes shortly after birth. Yours did not fully close. As such, it allows some blood and potentially clots to flow from the right side of your heart to the left side. A clot could flow through and get lodged in your brain (a stroke), your heart (an MI) or elsewhere in your body. The chance is small but greater than 0%. The doppler study of your legs was negative for deep vein thromboses which if they existed could potentially throw a clot. Neurology recommends closing the hole. Please take aspirin as an antiplatelet prophylaxis and follow up with your cardiologist within a week for further evaluation. resolved it is considered resolved. You may follow up with your doctors as an outpatient. resolution This may be related to your sciatica, be sure to follow up with your outpatient doctors. You may use over the counter pills for pain as needed. This includes medications like tylenol and NSAIDs. If taking NSAIDs (such as ibuprofen, motrin, naproxen), be sure not to take too many of them for too long of a time period as they have been known to cause stomach ulcers, also if you have heart disease, do not take too many NSAIDs. Be sure to rest and relax. You can use ice packs and heating packs as needed.

## 2018-09-21 NOTE — DISCHARGE NOTE ADULT - SECONDARY DIAGNOSIS.
PFO (patent foramen ovale) Chronic sinusitis, unspecified location Facial numbness Arm numbness left Leg numbness Sciatica of left side

## 2018-09-21 NOTE — PROGRESS NOTE ADULT - SUBJECTIVE AND OBJECTIVE BOX
INTERVAL HISTORY:        VITAL SIGNS:  Vital Signs Last 24 Hrs  T(C): 36.8 (21 Sep 2018 07:45), Max: 37.7 (20 Sep 2018 17:00)  T(F): 98.2 (21 Sep 2018 07:45), Max: 99.9 (20 Sep 2018 17:00)  HR: 78 (21 Sep 2018 07:45) (71 - 90)  BP: 113/73 (21 Sep 2018 07:45) (112/70 - 117/69)  BP(mean): --  RR: 17 (21 Sep 2018 07:45) (16 - 18)  SpO2: 98% (21 Sep 2018 07:45) (97% - 98%)    PHYSICAL EXAMINATION:    Mentation:    Language/Speech:  CN:  Visual Fields:  Motor:  Sensory:  DTR:  Babinski:      MEDS:  MEDICATIONS  (STANDING):  aspirin enteric coated 325 milliGRAM(s) Oral daily  sodium chloride 0.9%. 1000 milliLiter(s) (75 mL/Hr) IV Continuous <Continuous>    MEDICATIONS  (PRN):  acetaminophen   Tablet .. 650 milliGRAM(s) Oral every 6 hours PRN Mild Pain (1 - 3)      LABS:                          14.1   7.4   )-----------( 311      ( 19 Sep 2018 17:47 )             42.3     09-20    142  |  104  |  13.0  ----------------------------<  93  3.9   |  24.0  |  0.73    Ca    9.6      20 Sep 2018 06:38    TPro  8.4  /  Alb  5.2  /  TBili  0.4  /  DBili  x   /  AST  16  /  ALT  11  /  AlkPhos  44  09-19    LIVER FUNCTIONS - ( 19 Sep 2018 17:47 )  Alb: 5.2 g/dL / Pro: 8.4 g/dL / ALK PHOS: 44 U/L / ALT: 11 U/L / AST: 16 U/L / GGT: x               RADIOLOGY & ADDITIONAL STUDIES:    MRI- mvd  MRAs - neg  TTE- +shunt  probable PFO  JETT- pending    IMPRESSION & PLAN: INTERVAL HISTORY:    Off floor for JETT. Mom reports left face numnbess is improved    VITAL SIGNS:  Vital Signs Last 24 Hrs  T(C): 36.8 (21 Sep 2018 07:45), Max: 37.7 (20 Sep 2018 17:00)  T(F): 98.2 (21 Sep 2018 07:45), Max: 99.9 (20 Sep 2018 17:00)  HR: 78 (21 Sep 2018 07:45) (71 - 90)  BP: 113/73 (21 Sep 2018 07:45) (112/70 - 117/69)  BP(mean): --  RR: 17 (21 Sep 2018 07:45) (16 - 18)  SpO2: 98% (21 Sep 2018 07:45) (97% - 98%)    PHYSICAL EXAMINATION:    Mentation:    Language/Speech:  CN:  Visual Fields:  Motor:  Sensory:  DTR:  Babinski:      MEDS:  MEDICATIONS  (STANDING):  aspirin enteric coated 325 milliGRAM(s) Oral daily  sodium chloride 0.9%. 1000 milliLiter(s) (75 mL/Hr) IV Continuous <Continuous>    MEDICATIONS  (PRN):  acetaminophen   Tablet .. 650 milliGRAM(s) Oral every 6 hours PRN Mild Pain (1 - 3)      LABS:                          14.1   7.4   )-----------( 311      ( 19 Sep 2018 17:47 )             42.3     09-20    142  |  104  |  13.0  ----------------------------<  93  3.9   |  24.0  |  0.73    Ca    9.6      20 Sep 2018 06:38    TPro  8.4  /  Alb  5.2  /  TBili  0.4  /  DBili  x   /  AST  16  /  ALT  11  /  AlkPhos  44  09-19    LIVER FUNCTIONS - ( 19 Sep 2018 17:47 )  Alb: 5.2 g/dL / Pro: 8.4 g/dL / ALK PHOS: 44 U/L / ALT: 11 U/L / AST: 16 U/L / GGT: x               RADIOLOGY & ADDITIONAL STUDIES:    MRI- mvd  MRAs - neg  TTE- +shunt  probable PFO  JETT- pending    IMPRESSION & PLAN:      Complicated migraine with suspicion for PFO. Having JETT presently  Would suggest closure of JETT is confirmatory.  Should also have Hypercoag work up which can be done as outpatient  antiplatelet theapy is also advised

## 2018-09-21 NOTE — DISCHARGE NOTE ADULT - CARE PLAN
Principal Discharge DX:	Migraine with aura and without status migrainosus, not intractable  Goal:	Control  Assessment and plan of treatment:	You had left sided numbness which is likely secondary to migraine headaches with aura. Migraines can have unusual symptoms such as the ones you experienced. The MRI studies of your head were negative meaning that no stroke or other abnormality was found to explain your symptoms. Migraines can make you more susceptible to having strokes and transient ischemia, so it is important that you take full dose aspirin as an antiplatelet prophylaxis. Please follow up with Dr Alvarez within one week and get hypercoagulable studies.  Secondary Diagnosis:	PFO (patent foramen ovale)  Goal:	Further evaluation and possible closure  Assessment and plan of treatment:	You have a patent foramen ovale which is a hole between your R and L atria in your heart that usually closes shortly after birth. Yours did not fully close. As such, it allows some blood and potentially clots to flow from the right side of your heart to the left side. A clot could flow through and get lodged in your brain (a stroke), your heart (an MI) or elsewhere in your body. The chance is small but greater than 0%. The doppler study of your legs was negative for deep vein thromboses which if they existed could potentially throw a clot. Neurology recommends closing the hole. Please take aspirin as an antiplatelet prophylaxis and follow up with your cardiologist within a week for further evaluation.  Secondary Diagnosis:	Chronic sinusitis, unspecified location  Goal:	Control  Assessment and plan of treatment:	Follow up outpatient with your general practitioner and treat headaches as you have been doing with tylenol as needed. Principal Discharge DX:	Migraine with aura and without status migrainosus, not intractable  Goal:	Control  Assessment and plan of treatment:	You had left sided numbness which is likely secondary to migraine headaches with aura. Migraines can have unusual symptoms such as the ones you experienced. The MRI studies of your head were negative meaning that no stroke or other abnormality was found to explain your symptoms. Migraines can make you more susceptible to having strokes and transient ischemia, so it is important that you take full dose aspirin as an antiplatelet prophylaxis. Please follow up with Dr Alvarez within one week and get hypercoagulable studies.  Secondary Diagnosis:	PFO (patent foramen ovale)  Goal:	Further evaluation and possible closure  Assessment and plan of treatment:	You have a patent foramen ovale which is a hole between your Right and Left atria in your heart that usually closes shortly after birth. Yours did not fully close. As such, it allows some blood and potentially clots to flow from the right side of your heart to the left side. A clot could flow through and get lodged in your brain (a stroke), your heart (an MI) or elsewhere in your body. The chance is small but greater than 0%. The doppler study of your legs was negative for deep vein thromboses which if they existed could potentially throw a clot. Neurology recommends closing the hole. Please take aspirin as an antiplatelet prophylaxis and follow up with your cardiologist within a week for further evaluation.  Secondary Diagnosis:	Chronic sinusitis, unspecified location  Goal:	Control  Assessment and plan of treatment:	Follow up outpatient with your general practitioner and treat headaches as you have been doing with tylenol as needed.  Secondary Diagnosis:	Facial numbness  Goal:	resolved  Assessment and plan of treatment:	it is considered resolved. You may follow up with your doctors as an outpatient.  Secondary Diagnosis:	Arm numbness left  Goal:	resolved  Assessment and plan of treatment:	it is considered resolved. You may follow up with your doctors as an outpatient.  Secondary Diagnosis:	Leg numbness  Goal:	resolution  Assessment and plan of treatment:	This may be related to your sciatica, be sure to follow up with your outpatient doctors.  Secondary Diagnosis:	Sciatica of left side  Goal:	resolution  Assessment and plan of treatment:	You may use over the counter pills for pain as needed. This includes medications like tylenol and NSAIDs. If taking NSAIDs (such as ibuprofen, motrin, naproxen), be sure not to take too many of them for too long of a time period as they have been known to cause stomach ulcers, also if you have heart disease, do not take too many NSAIDs. Be sure to rest and relax. You can use ice packs and heating packs as needed.

## 2018-09-21 NOTE — DISCHARGE NOTE ADULT - NS AS ACTIVITY OBS
As you are able/Showering allowed/Walking-Outdoors allowed/Stairs allowed/Walking-Indoors allowed/Return to Work/School allowed/Bathing allowed/Driving allowed

## 2018-09-21 NOTE — DISCHARGE NOTE ADULT - MEDICATION SUMMARY - MEDICATIONS TO STOP TAKING
I will STOP taking the medications listed below when I get home from the hospital:    amoxicillin 875 mg oral tablet  -- 1 tab(s) by mouth every 12 hours

## 2018-09-21 NOTE — DISCHARGE NOTE ADULT - PROVIDER TOKENS
FREE:[LAST:[Maribel],FIRST:[Harrison],PHONE:[phone]],FAX:[phone]    -],ADDRESS:[[address] [place] [address] [place] [address]]],TOKEN:'[id number]',TOKEN:'[id number]'

## 2018-09-21 NOTE — DISCHARGE NOTE ADULT - ADDITIONAL INSTRUCTIONS
Follow up with your cardiologist within one week  Follow up with your neurologist within one week  Follow up with your general practitioner within one week

## 2018-09-21 NOTE — PROGRESS NOTE ADULT - REASON FOR ADMISSION
Left-sided facial numbness

## 2018-09-21 NOTE — PROGRESS NOTE ADULT - SUBJECTIVE AND OBJECTIVE BOX
S/p JETT  Moderate L->R PFO   mild mr  mild tr    Continue Asa  no further cardiac interventions will arrange outpt f/u with Dr Gleason in 2 weeks  f/u in our office in 4weeks

## 2018-09-21 NOTE — DISCHARGE NOTE ADULT - HOSPITAL COURSE
46 y/o female with hx of left sided sciatica, migraines, and vertigo presents with complaints of left facial numbness the day before admission and left arm numbness x 1 week. The left facial and left upper extremity numbness resolved with some residual LLE numbness. It was likely secondary to a complicated migraine. MRI head findings with several small high T2 and flair signal lesions in the deep and subcortical white matter of both hemispheres, concerning for demyelination process vs migraine headaches vs lyme vs vasculitis. MRA head and neck was negative. CATHI was negative and Lyme is pending. A TTE was concerning for possible PFO and a JETT confirmed it. A LE Doppler was negative for DVT. Neurology recommends sealing the PFO and the patient will follow up with both neurology and cardiology outpatient for further eval. The patient was given full dose aspirin as an antiplatelet prophylaxis for thrombi. The lower extremity numbness may be secondary to the migraine or her chronic sciatica. She also had a sinus headache treated with tylenol.    She is medically optimized for discharge home.    This discharge took 45 minutes    Sinusitis  -was given amoxicillin 2 days ago for sinusitis that was diagnosed at an urgent care  -currently asymptomatic and imaging studies negative, Abx DCed  - Possible Sinus H/A last night 46 y/o female with hx of left sided sciatica, migraines, and vertigo presents with complaints of left facial numbness the day before admission and left arm numbness x 1 week. The left facial and left upper extremity numbness resolved with some residual LLE numbness. It was likely secondary to a complicated migraine. MRI head findings with several small high T2 and flair signal lesions in the deep and subcortical white matter of both hemispheres, concerning for demyelination process vs migraine headaches vs lyme vs vasculitis. MRA head and neck was negative. CATHI was negative and Lyme is pending. A TTE was concerning for possible PFO and a JETT confirmed it. A LE Doppler was negative for DVT. Neurology recommends sealing the PFO and the patient will follow up with both neurology and cardiology outpatient for further eval. The patient was given full dose aspirin as an antiplatelet prophylaxis for thrombi. The lower extremity numbness may be secondary to the migraine or her chronic sciatica. She also had a sinus headache treated with tylenol.    She is medically optimized for discharge home.    This discharge took 45 minutes

## 2018-09-21 NOTE — DISCHARGE NOTE ADULT - MEDICATION SUMMARY - MEDICATIONS TO TAKE
I will START or STAY ON the medications listed below when I get home from the hospital:    acetaminophen 325 mg oral tablet  -- 2 tab(s) by mouth every 6 hours, As needed, Mild Pain (1 - 3)  -- Indication: For Migraines    aspirin 325 mg oral delayed release tablet  -- 1 tab(s) by mouth once a day   -- Indication: For Antiplatelet

## 2018-09-21 NOTE — DISCHARGE NOTE ADULT - OTHER SIGNIFICANT FINDINGS
< from: MR Head No Cont (09.19.18 @ 20:15) >  IMPRESSION:   Several nonspecific foci of high T2 and flair signal lesions in the deep   and subcortical   white matter without mass effect or edema with a broad differential   diagnosis using but not limited to vasculitis or migraine headache, lyme   disease, early   small vessel ischemic changes or foci of demyelination.   < end of copied text >    < from: MR Angio Head No Cont (09.19.18 @ 23:37) >  IMPRESSION:         Normal head/brain MRA.  < end of copied text >    < from: MR Angio Neck No Cont (09.19.18 @ 23:37) >  IMPRESSION:       No hemodynamically significant stenosis.  < end of copied text >                          14.1   7.4   )-----------( 311      ( 19 Sep 2018 17:47 )             42.3     09-20    142  |  104  |  13.0  ----------------------------<  93  3.9   |  24.0  |  0.73    Ca    9.6      20 Sep 2018 06:38    TPro  8.4  /  Alb  5.2  /  TBili  0.4  /  DBili  x   /  AST  16  /  ALT  11  /  AlkPhos  44  09-19

## 2018-09-21 NOTE — PROGRESS NOTE ADULT - SUBJECTIVE AND OBJECTIVE BOX
Patient is a 43y old  Female who presents with a chief complaint of Left-sided facial numbness (20 Sep 2018 09:33)    INTERVAL HPI/OVERNIGHT EVENTS:  42yo Female seen at bedside and chart reviewed. Pt has no complaints overnight. Pt says has not had H/A for 24h.  Pt denies SOB, cough, CP, palpitations, Headache, lightheadedness, dizziness, abdominal pain, nausea, vomiting, diarrhea, constipation, dysuria, lower extremity edema    Allergies  Bactrim (Rash)  Intolerances    Vital Signs Last 24 Hrs  T(C): 36.8 (20 Sep 2018 23:40), Max: 37.7 (20 Sep 2018 17:00)  T(F): 98.2 (20 Sep 2018 23:40), Max: 99.9 (20 Sep 2018 17:00)  HR: 71 (20 Sep 2018 23:40) (71 - 90)  BP: 117/69 (20 Sep 2018 23:40) (112/70 - 128/82)  RR: 16 (20 Sep 2018 23:40) (16 - 18)  SpO2: 97% (20 Sep 2018 23:40) (97% - 99%)      Daily     Daily   I&O's Detail      PHYSICAL EXAM:  GENERAL: NAD, Looks old for age, well-groomed, well-developed  NECK: Supple, No JVD  NERVOUS SYSTEM:  Alert & Oriented X3, Good concentration; Motor Strength 5/5 B/L upper and lower extremities  CHEST/LUNG: Clear to auscultation bilaterally; No rales, rhonchi, wheezing, or rubs  HEART: Regular rate; No murmurs, rubs, or gallops  ABDOMEN: Soft, Nontender, Nondistended; Bowel sounds present  EXTREMITIES:  No edema  LYMPH: No lymphadenopathy noted  PSYCH: Normal mood and affect    LABS:                        14.1   7.4   )-----------( 311      ( 19 Sep 2018 17:47 )             42.3     CBC Full  -  ( 19 Sep 2018 17:47 )  WBC Count : 7.4 K/uL  Hemoglobin : 14.1 g/dL  Hematocrit : 42.3 %  Platelet Count - Automated : 311 K/uL  Mean Cell Volume : 86.7 fl  Mean Cell Hemoglobin : 28.9 pg  Mean Cell Hemoglobin Concentration : 33.3 g/dL  Auto Neutrophil # : 4.7 K/uL  Auto Lymphocyte # : 2.2 K/uL  Auto Monocyte # : 0.5 K/uL  Auto Eosinophil # : 0.0 K/uL  Auto Basophil # : 0.0 K/uL  Auto Neutrophil % : 62.6 %  Auto Lymphocyte % : 29.9 %  Auto Monocyte % : 6.2 %  Auto Eosinophil % : 0.7 %  Auto Basophil % : 0.5 %    09-20    142  |  104  |  13.0  ----------------------------<  93  3.9   |  24.0  |  0.73    Ca    9.6      20 Sep 2018 06:38    TPro  8.4  /  Alb  5.2  /  TBili  0.4  /  DBili  x   /  AST  16  /  ALT  11  /  AlkPhos  44  09-19      NEUROLOGIC:  acetaminophen   Tablet .. 650 milliGRAM(s) Oral every 6 hours PRN    HEMATOLOGIC:  aspirin enteric coated 81 milliGRAM(s) Oral daily      RADIOLOGY & ADDITIONAL TESTS: Patient is a 43y old  Female who presents with a chief complaint of Left-sided facial numbness (20 Sep 2018 09:33)    INTERVAL HPI/OVERNIGHT EVENTS:  42yo Female seen at bedside and chart reviewed. Pt had B/L non throbbing headache treated with tylenol overnight. Possible Sinus H/A. Still mentions some LLE numbness in thigh and behind knee.  Pt denies SOB, cough, CP, palpitations, Headache, lightheadedness, dizziness, abdominal pain, nausea, vomiting, diarrhea, constipation, dysuria, lower extremity edema    Allergies  Bactrim (Rash)  Intolerances    Vital Signs Last 24 Hrs  T(C): 36.8 (20 Sep 2018 23:40), Max: 37.7 (20 Sep 2018 17:00)  T(F): 98.2 (20 Sep 2018 23:40), Max: 99.9 (20 Sep 2018 17:00)  HR: 71 (20 Sep 2018 23:40) (71 - 90)  BP: 117/69 (20 Sep 2018 23:40) (112/70 - 128/82)  RR: 16 (20 Sep 2018 23:40) (16 - 18)  SpO2: 97% (20 Sep 2018 23:40) (97% - 99%)      Daily     Daily   I&O's Detail      PHYSICAL EXAM:  GENERAL: NAD, well-groomed, well-developed  HEENT: No sinus tenderness to palpation  NECK: Supple, No JVD  NERVOUS SYSTEM:  Alert & Oriented X3, Good concentration; Motor Strength 5/5 B/L upper and lower extremities, no loss of sensation  CHEST/LUNG: Clear to auscultation bilaterally; No rales, rhonchi, wheezing, or rubs  HEART: Regular rate; No murmurs, rubs, or gallops  ABDOMEN: Soft, Nontender, Nondistended; Bowel sounds present  EXTREMITIES:  No edema or calf pain  LYMPH: No lymphadenopathy noted  PSYCH: Normal mood and affect    LABS:                        14.1   7.4   )-----------( 311      ( 19 Sep 2018 17:47 )             42.3     CBC Full  -  ( 19 Sep 2018 17:47 )  WBC Count : 7.4 K/uL  Hemoglobin : 14.1 g/dL  Hematocrit : 42.3 %  Platelet Count - Automated : 311 K/uL  Mean Cell Volume : 86.7 fl  Mean Cell Hemoglobin : 28.9 pg  Mean Cell Hemoglobin Concentration : 33.3 g/dL  Auto Neutrophil # : 4.7 K/uL  Auto Lymphocyte # : 2.2 K/uL  Auto Monocyte # : 0.5 K/uL  Auto Eosinophil # : 0.0 K/uL  Auto Basophil # : 0.0 K/uL  Auto Neutrophil % : 62.6 %  Auto Lymphocyte % : 29.9 %  Auto Monocyte % : 6.2 %  Auto Eosinophil % : 0.7 %  Auto Basophil % : 0.5 %    09-20    142  |  104  |  13.0  ----------------------------<  93  3.9   |  24.0  |  0.73    Ca    9.6      20 Sep 2018 06:38    TPro  8.4  /  Alb  5.2  /  TBili  0.4  /  DBili  x   /  AST  16  /  ALT  11  /  AlkPhos  44  09-19      NEUROLOGIC:  acetaminophen   Tablet .. 650 milliGRAM(s) Oral every 6 hours PRN    HEMATOLOGIC:  aspirin enteric coated 81 milliGRAM(s) Oral daily      RADIOLOGY & ADDITIONAL TESTS:    < from: TTE Echo Complete w/Doppler (09.20.18 @ 16:11) >  Summary:   1. Technically good study.   2. Normal global left ventricular systolic function.   3. Left ventricular ejection fraction, by visual estimation, is 65 to 70%.   4. Trace pulmonic valve regurgitation.   5. Trace tricuspid regurgitation.   6. There is evidence of right to left shunt with use of agitated saline. This is most likely due to a PFO. Recommend further testing such as JETT for further evaluation.  < end of copied text > Patient is a 43y old  Female who presents with a chief complaint of Left-sided facial numbness (20 Sep 2018 09:33)    INTERVAL HPI/OVERNIGHT EVENTS:  44yo Female seen at bedside and chart reviewed. Pt had B/L non throbbing headache treated with tylenol overnight. She said it felt like a Sinus H/A. Still mentions some LLE numbness in thigh and behind knee.  Pt denies SOB, cough, CP, palpitations, Headache, lightheadedness, dizziness, abdominal pain, nausea, vomiting, diarrhea, constipation, dysuria, lower extremity edema    Allergies  Bactrim (Rash)  Intolerances    Vital Signs Last 24 Hrs  T(C): 36.8 (20 Sep 2018 23:40), Max: 37.7 (20 Sep 2018 17:00)  T(F): 98.2 (20 Sep 2018 23:40), Max: 99.9 (20 Sep 2018 17:00)  HR: 71 (20 Sep 2018 23:40) (71 - 90)  BP: 117/69 (20 Sep 2018 23:40) (112/70 - 128/82)  RR: 16 (20 Sep 2018 23:40) (16 - 18)  SpO2: 97% (20 Sep 2018 23:40) (97% - 99%)      Daily     Daily   I&O's Detail      PHYSICAL EXAM:  GENERAL: NAD, well-groomed, well-developed  HEENT: No sinus tenderness to palpation  NECK: Supple, No JVD  NERVOUS SYSTEM:  Alert & Oriented X3, Good concentration; Motor Strength 5/5 B/L upper and lower extremities, no loss of sensation  CHEST/LUNG: Clear to auscultation bilaterally; No rales, rhonchi, wheezing, or rubs  HEART: Regular rate; No murmurs, rubs, or gallops  ABDOMEN: Soft, Nontender, Nondistended; Bowel sounds present  EXTREMITIES:  No edema or calf pain  LYMPH: No lymphadenopathy noted  PSYCH: Normal mood and affect    LABS:                        14.1   7.4   )-----------( 311      ( 19 Sep 2018 17:47 )             42.3     CBC Full  -  ( 19 Sep 2018 17:47 )  WBC Count : 7.4 K/uL  Hemoglobin : 14.1 g/dL  Hematocrit : 42.3 %  Platelet Count - Automated : 311 K/uL  Mean Cell Volume : 86.7 fl  Mean Cell Hemoglobin : 28.9 pg  Mean Cell Hemoglobin Concentration : 33.3 g/dL  Auto Neutrophil # : 4.7 K/uL  Auto Lymphocyte # : 2.2 K/uL  Auto Monocyte # : 0.5 K/uL  Auto Eosinophil # : 0.0 K/uL  Auto Basophil # : 0.0 K/uL  Auto Neutrophil % : 62.6 %  Auto Lymphocyte % : 29.9 %  Auto Monocyte % : 6.2 %  Auto Eosinophil % : 0.7 %  Auto Basophil % : 0.5 %    09-20    142  |  104  |  13.0  ----------------------------<  93  3.9   |  24.0  |  0.73    Ca    9.6      20 Sep 2018 06:38    TPro  8.4  /  Alb  5.2  /  TBili  0.4  /  DBili  x   /  AST  16  /  ALT  11  /  AlkPhos  44  09-19      NEUROLOGIC:  acetaminophen   Tablet .. 650 milliGRAM(s) Oral every 6 hours PRN    HEMATOLOGIC:  aspirin enteric coated 81 milliGRAM(s) Oral daily      RADIOLOGY & ADDITIONAL TESTS:    < from: TTE Echo Complete w/Doppler (09.20.18 @ 16:11) >  Summary:   1. Technically good study.   2. Normal global left ventricular systolic function.   3. Left ventricular ejection fraction, by visual estimation, is 65 to 70%.   4. Trace pulmonic valve regurgitation.   5. Trace tricuspid regurgitation.   6. There is evidence of right to left shunt with use of agitated saline. This is most likely due to a PFO. Recommend further testing such as JETT for further evaluation.  < end of copied text >

## 2018-09-21 NOTE — PROGRESS NOTE ADULT - ATTENDING COMMENTS
Patient seen and examined at the bedside. Agree with the above history, physical, assessment, and plan with the necessary amendments/elaborations below:    clinically stable. symptoms from arrival resolved with exception of mild LLE numbness which per pt is chronic in setting of her sciatica. ruled out for CVA. clinically was also unlikely given symptoms of face were ipsilateral with numbness on body rather than being contralateral. agree with neuro, was likely related to migraine w/ aura. agree with starting asa given inc risk for cva in this setting. white matter changes may be related to migraine HA as well, however per neuro may also be consistent with PFO, therefore will pursue echo with bubble study. other differential includes MS however also not as high likelihood, pt informed and advised to cont neuro fu as outpatient in case work up needed to definitively r/o MS.
Patient seen and examined at the bedside. Agree with the above history, physical, assessment, and plan with the necessary amendments/elaborations below:    clinically stable. sp JETT today with + findings of moderately sized PFO. given white matter brain findings + migraine w/ aura making pt higher risk for CVA, likely to have PFO closure done electively in future if she would like. she is to follow with cardio in office to discuss options and proceed if desired. also advised to cont asa 325mg and also fu with neuro outpatient. no further inpatient care needed, stable for dc today.

## 2018-09-21 NOTE — DISCHARGE NOTE ADULT - CARE PROVIDER_API CALL
Harrison López  [address]  [place] [id number]  Phone: [phone]  Fax: (   )    -    Mian Cadet (DO), Internal Medicine  [address] [place] [address]  Phone: [phone]  Fax: [phone]    Kulwant Alvarez), Neurology  [address] [place] [address]  Phone: [phone]  Fax: [phone]

## 2018-09-21 NOTE — PROGRESS NOTE ADULT - ASSESSMENT
44 y/o female with hx of left sided sciatica, migraines w/o aura, and vertigo, presents with complaints of left facial numbness since yesterday and left arm numbness x 1 week.     Left facial numbness  - Facial and UE numbness has resolved. Some residual LLE numbness.   - H/A last night Tx w/tylenol  -unknown etiology  -MRI head findings with several small high T2 and flair signal lesions in the deep and subcortical white matter of both hemispheres, concerning for demyelination process vs migraine headaches vs lyme vs vasculitis  -MRA head and neck prelim negative  -f/u lyme studies and CATHI  -neuro consult appreciated  - TTE concerning for possible PFO, consider JETT  -possible LP to assess csf oligoclonal bands    Sinusitis  -was given amoxicillin 2 days ago for sinusitis that was diagnosed at an urgent care  -currently asymptomatic and imaging studies negative, Abx DCed  - Possible Sinus H/A last night    Preventive measure  -vcd boots  -low risk pt not needing pharmacologic AC at this time 44 y/o female with hx of left sided sciatica, migraines w/o aura, and vertigo, presents with complaints of left facial numbness the day before admission and left arm numbness x 1 week.     Left facial numbness  - Facial and UE numbness has resolved. Some residual LLE numbness.   -unknown etiology  -MRI head findings with several small high T2 and flair signal lesions in the deep and subcortical white matter of both hemispheres, concerning for demyelination process vs migraine headaches vs lyme vs vasculitis  -MRA head and neck prelim negative  -f/u lyme studies and CATHI  -neuro consult appreciated  - H/A last night Tx w/tylenol  - TTE concerning for possible PFO, consider JETT  -possible LP to assess csf oligoclonal bands    Sinusitis  -was given amoxicillin 2 days ago for sinusitis that was diagnosed at an urgent care  -currently asymptomatic and imaging studies negative, Abx DCed  - Possible Sinus H/A last night    Preventive measure  -vcd boots  -low risk pt not needing pharmacologic AC at this time

## 2018-09-21 NOTE — DISCHARGE NOTE ADULT - PATIENT PORTAL LINK FT
You can access the [facility] Patient Portal, offered by [facility], by registering with the following website: [email or web address]

## 2018-09-22 LAB
B BURGDOR C6 AB SER-ACNC: NEGATIVE — SIGNIFICANT CHANGE UP
B BURGDOR IGG+IGM SER-ACNC: 0.06 INDEX — SIGNIFICANT CHANGE UP (ref 0.01–0.89)

## 2019-04-15 ENCOUNTER — FORM ENCOUNTER (OUTPATIENT)
Age: 44
End: 2019-04-15

## 2019-08-25 ENCOUNTER — TRANSCRIPTION ENCOUNTER (OUTPATIENT)
Age: 44
End: 2019-08-25

## 2019-09-06 ENCOUNTER — TRANSCRIPTION ENCOUNTER (OUTPATIENT)
Age: 44
End: 2019-09-06

## 2019-09-25 PROBLEM — G43.909 MIGRAINE, UNSPECIFIED, NOT INTRACTABLE, WITHOUT STATUS MIGRAINOSUS: Chronic | Status: ACTIVE | Noted: 2018-09-19

## 2019-10-03 ENCOUNTER — APPOINTMENT (OUTPATIENT)
Dept: INTERNAL MEDICINE | Facility: CLINIC | Age: 44
End: 2019-10-03
Payer: MEDICAID

## 2019-10-03 VITALS
BODY MASS INDEX: 23.05 KG/M2 | DIASTOLIC BLOOD PRESSURE: 92 MMHG | SYSTOLIC BLOOD PRESSURE: 154 MMHG | HEIGHT: 64 IN | WEIGHT: 135 LBS | HEART RATE: 80 BPM

## 2019-10-03 DIAGNOSIS — Z11.4 ENCOUNTER FOR SCREENING FOR HUMAN IMMUNODEFICIENCY VIRUS [HIV]: ICD-10-CM

## 2019-10-03 DIAGNOSIS — Z11.59 ENCOUNTER FOR SCREENING FOR OTHER VIRAL DISEASES: ICD-10-CM

## 2019-10-03 DIAGNOSIS — Z23 ENCOUNTER FOR IMMUNIZATION: ICD-10-CM

## 2019-10-03 PROCEDURE — 36415 COLL VENOUS BLD VENIPUNCTURE: CPT

## 2019-10-03 PROCEDURE — 99386 PREV VISIT NEW AGE 40-64: CPT | Mod: 25

## 2019-10-03 PROCEDURE — 90686 IIV4 VACC NO PRSV 0.5 ML IM: CPT

## 2019-10-03 PROCEDURE — G0442 ANNUAL ALCOHOL SCREEN 15 MIN: CPT | Mod: 59

## 2019-10-03 PROCEDURE — G0444 DEPRESSION SCREEN ANNUAL: CPT | Mod: 59

## 2019-10-03 PROCEDURE — G0008: CPT

## 2019-10-03 NOTE — PHYSICAL EXAM
[No Acute Distress] : no acute distress [Well Nourished] : well nourished [Well Developed] : well developed [Normal Sclera/Conjunctiva] : normal sclera/conjunctiva [Well-Appearing] : well-appearing [PERRL] : pupils equal round and reactive to light [EOMI] : extraocular movements intact [Normal Outer Ear/Nose] : the outer ears and nose were normal in appearance [Normal Oropharynx] : the oropharynx was normal [No JVD] : no jugular venous distention [No Lymphadenopathy] : no lymphadenopathy [Supple] : supple [Thyroid Normal, No Nodules] : the thyroid was normal and there were no nodules present [No Respiratory Distress] : no respiratory distress  [No Accessory Muscle Use] : no accessory muscle use [Clear to Auscultation] : lungs were clear to auscultation bilaterally [Normal Rate] : normal rate  [Regular Rhythm] : with a regular rhythm [No Murmur] : no murmur heard [Normal S1, S2] : normal S1 and S2 [No Carotid Bruits] : no carotid bruits [No Abdominal Bruit] : a ~M bruit was not heard ~T in the abdomen [No Varicosities] : no varicosities [Pedal Pulses Present] : the pedal pulses are present [No Edema] : there was no peripheral edema [No Palpable Aorta] : no palpable aorta [No Extremity Clubbing/Cyanosis] : no extremity clubbing/cyanosis [Soft] : abdomen soft [Non Tender] : non-tender [Non-distended] : non-distended [No Masses] : no abdominal mass palpated [No HSM] : no HSM [Normal Bowel Sounds] : normal bowel sounds [Normal Posterior Cervical Nodes] : no posterior cervical lymphadenopathy [Normal Anterior Cervical Nodes] : no anterior cervical lymphadenopathy [No CVA Tenderness] : no CVA  tenderness [No Spinal Tenderness] : no spinal tenderness [No Joint Swelling] : no joint swelling [Grossly Normal Strength/Tone] : grossly normal strength/tone [No Rash] : no rash [Coordination Grossly Intact] : coordination grossly intact [No Focal Deficits] : no focal deficits [Normal Gait] : normal gait [Normal Affect] : the affect was normal [Normal Insight/Judgement] : insight and judgment were intact

## 2019-10-03 NOTE — HISTORY OF PRESENT ILLNESS
[FreeTextEntry1] : physical exam  [de-identified] : Ms. TANO JULIEN is a 44 year female comes to the office for physical exam. Patient feels well and has no complaints at this time.

## 2019-10-03 NOTE — HEALTH RISK ASSESSMENT
[Good] : ~his/her~  mood as  good [] : No [Yes] : Yes [Monthly or less (1 pt)] : Monthly or less (1 point) [No] : In the past 12 months have you used drugs other than those required for medical reasons? No [0] : 2) Feeling down, depressed, or hopeless: Not at all (0) [de-identified] : cardiologist, OB/GYN [Audit-CScore] : 0 [VMI2Odnfc] : 0 [Patient declined Low Dose CT Scan] : Patient declined Low Dose CT Scan [Patient declined Retinal Exam] : Patient declined Retinal Exam. [Patient reported mammogram was normal] : Patient reported mammogram was normal [Patient reported PAP Smear was normal] : Patient reported PAP Smear was normal [Patient declined bone density test] : Patient declined bone density test [Patient reported colonoscopy was normal] : Patient reported colonoscopy was normal [HIV test declined] : HIV test declined [Hepatitis C test declined] : Hepatitis C test declined [MammogramDate] : 09/14 [PapSmearDate] : 12/18 [ColonoscopyDate] : 09/14

## 2019-10-04 LAB
ALBUMIN SERPL ELPH-MCNC: 5 G/DL
ALP BLD-CCNC: 40 U/L
ALT SERPL-CCNC: 13 U/L
ANION GAP SERPL CALC-SCNC: 15 MMOL/L
AST SERPL-CCNC: 16 U/L
BASOPHILS # BLD AUTO: 0.09 K/UL
BASOPHILS NFR BLD AUTO: 1.1 %
BILIRUB SERPL-MCNC: 0.5 MG/DL
BUN SERPL-MCNC: 22 MG/DL
CALCIUM SERPL-MCNC: 9.6 MG/DL
CHLORIDE SERPL-SCNC: 99 MMOL/L
CHOLEST SERPL-MCNC: 182 MG/DL
CHOLEST/HDLC SERPL: 3 RATIO
CO2 SERPL-SCNC: 24 MMOL/L
CREAT SERPL-MCNC: 0.92 MG/DL
EOSINOPHIL # BLD AUTO: 0.2 K/UL
EOSINOPHIL NFR BLD AUTO: 2.5 %
ESTIMATED AVERAGE GLUCOSE: 103 MG/DL
GLUCOSE SERPL-MCNC: 68 MG/DL
HBA1C MFR BLD HPLC: 5.2 %
HCT VFR BLD CALC: 39 %
HCV AB SER QL: NONREACTIVE
HCV S/CO RATIO: 0.13 S/CO
HDLC SERPL-MCNC: 60 MG/DL
HGB BLD-MCNC: 13 G/DL
HIV1+2 AB SPEC QL IA.RAPID: NONREACTIVE
IMM GRANULOCYTES NFR BLD AUTO: 0.1 %
LDLC SERPL CALC-MCNC: 92 MG/DL
LYMPHOCYTES # BLD AUTO: 2.66 K/UL
LYMPHOCYTES NFR BLD AUTO: 33.5 %
MAN DIFF?: NORMAL
MCHC RBC-ENTMCNC: 29.5 PG
MCHC RBC-ENTMCNC: 33.3 GM/DL
MCV RBC AUTO: 88.4 FL
MONOCYTES # BLD AUTO: 0.62 K/UL
MONOCYTES NFR BLD AUTO: 7.8 %
NEUTROPHILS # BLD AUTO: 4.35 K/UL
NEUTROPHILS NFR BLD AUTO: 55 %
PLATELET # BLD AUTO: 294 K/UL
POTASSIUM SERPL-SCNC: 4.8 MMOL/L
PROT SERPL-MCNC: 7.4 G/DL
RBC # BLD: 4.41 M/UL
RBC # FLD: 13.2 %
SODIUM SERPL-SCNC: 138 MMOL/L
TRIGL SERPL-MCNC: 150 MG/DL
TSH SERPL-ACNC: 1.18 UIU/ML
WBC # FLD AUTO: 7.93 K/UL

## 2020-01-06 ENCOUNTER — FORM ENCOUNTER (OUTPATIENT)
Age: 45
End: 2020-01-06

## 2020-06-17 ENCOUNTER — APPOINTMENT (OUTPATIENT)
Dept: NEUROLOGY | Facility: CLINIC | Age: 45
End: 2020-06-17
Payer: COMMERCIAL

## 2020-06-17 PROCEDURE — 99204 OFFICE O/P NEW MOD 45 MIN: CPT

## 2020-07-16 ENCOUNTER — APPOINTMENT (OUTPATIENT)
Dept: OTOLARYNGOLOGY | Facility: CLINIC | Age: 45
End: 2020-07-16
Payer: COMMERCIAL

## 2020-07-16 VITALS
RESPIRATION RATE: 14 BRPM | TEMPERATURE: 98.4 F | SYSTOLIC BLOOD PRESSURE: 168 MMHG | DIASTOLIC BLOOD PRESSURE: 85 MMHG | HEART RATE: 83 BPM

## 2020-07-16 DIAGNOSIS — R42 DIZZINESS AND GIDDINESS: ICD-10-CM

## 2020-07-16 DIAGNOSIS — Z83.52 FAMILY HISTORY OF EAR DISORDERS: ICD-10-CM

## 2020-07-16 PROCEDURE — 92557 COMPREHENSIVE HEARING TEST: CPT

## 2020-07-16 PROCEDURE — 92567 TYMPANOMETRY: CPT

## 2020-07-16 PROCEDURE — 99205 OFFICE O/P NEW HI 60 MIN: CPT | Mod: 25

## 2020-07-16 NOTE — HISTORY OF PRESENT ILLNESS
[de-identified] : 44 y/o woman presents today after referral by vestibular therapist (susan).  Pt. 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  Episodes can last can days and are associated with nausea and vomitting.  + Headaches  Pt. has tried Epley maneuver in the past and has identified the most likely side as right.  Pt. has had MRI's, CT's, VNG in the past and all negative as per Pt. Denies changes in hearing, otalgia, or otorrhea.\par she has motion sickness and light sensitivity, she has migraines. on BC. mother and son with migraines.  \par she had worsening of sx after 2 surgeries.

## 2020-07-16 NOTE — PHYSICAL EXAM
[Rinne Test Air Conduction Persists > Bone Conduction Right] : air conduction greater than bone conduction on the right [Rinne Test Air Conduction Persists > Bone Conduction Left] : air conduction greater than bone conduction on the left [Hearing Tafoya Test (Tuning Fork On Forehead)] : no lateralization of tone [Normal] : no rashes [Hearing Loss Right Only] : normal [Hearing Loss Left Only] : normal [Nystagmus] : ~T no ~M nystagmus was seen [Fukuda Step Test] : Fukuda Step Test was Negative [Romberg's Sign] : Romberg's sign was absent [Fistula Sign] : Fistula Sign: Negative [Past-Pointing] : Past-Pointing: Negative

## 2020-08-05 DIAGNOSIS — M54.2 CERVICALGIA: ICD-10-CM

## 2020-08-27 ENCOUNTER — APPOINTMENT (OUTPATIENT)
Dept: OTOLARYNGOLOGY | Facility: CLINIC | Age: 45
End: 2020-08-27

## 2021-06-03 ENCOUNTER — FORM ENCOUNTER (OUTPATIENT)
Age: 46
End: 2021-06-03

## 2021-07-06 ENCOUNTER — FORM ENCOUNTER (OUTPATIENT)
Age: 46
End: 2021-07-06

## 2021-08-25 ENCOUNTER — EMERGENCY (EMERGENCY)
Facility: HOSPITAL | Age: 46
LOS: 1 days | Discharge: HOME | End: 2021-08-25
Attending: EMERGENCY MEDICINE
Payer: MEDICAID

## 2021-08-25 VITALS — HEART RATE: 86 BPM | RESPIRATION RATE: 16 BRPM | OXYGEN SATURATION: 100 %

## 2021-08-25 VITALS
TEMPERATURE: 98 F | RESPIRATION RATE: 18 BRPM | OXYGEN SATURATION: 99 % | SYSTOLIC BLOOD PRESSURE: 175 MMHG | HEIGHT: 64 IN | HEART RATE: 112 BPM | WEIGHT: 145.06 LBS | DIASTOLIC BLOOD PRESSURE: 85 MMHG

## 2021-08-25 DIAGNOSIS — Z98.89 OTHER SPECIFIED POSTPROCEDURAL STATES: Chronic | ICD-10-CM

## 2021-08-25 LAB
ANION GAP SERPL CALC-SCNC: 10 MMOL/L — SIGNIFICANT CHANGE UP (ref 5–17)
BASOPHILS # BLD AUTO: 0.05 K/UL — SIGNIFICANT CHANGE UP (ref 0–0.2)
BASOPHILS NFR BLD AUTO: 0.6 % — SIGNIFICANT CHANGE UP (ref 0–2)
BUN SERPL-MCNC: 17.2 MG/DL — SIGNIFICANT CHANGE UP (ref 8–20)
CALCIUM SERPL-MCNC: 9.5 MG/DL — SIGNIFICANT CHANGE UP (ref 8.6–10.2)
CHLORIDE SERPL-SCNC: 104 MMOL/L — SIGNIFICANT CHANGE UP (ref 98–107)
CO2 SERPL-SCNC: 25 MMOL/L — SIGNIFICANT CHANGE UP (ref 22–29)
CREAT SERPL-MCNC: 0.72 MG/DL — SIGNIFICANT CHANGE UP (ref 0.5–1.3)
EOSINOPHIL # BLD AUTO: 0.22 K/UL — SIGNIFICANT CHANGE UP (ref 0–0.5)
EOSINOPHIL NFR BLD AUTO: 2.6 % — SIGNIFICANT CHANGE UP (ref 0–6)
GLUCOSE SERPL-MCNC: 116 MG/DL — HIGH (ref 70–99)
HCT VFR BLD CALC: 37.5 % — SIGNIFICANT CHANGE UP (ref 34.5–45)
HGB BLD-MCNC: 12.6 G/DL — SIGNIFICANT CHANGE UP (ref 11.5–15.5)
IMM GRANULOCYTES NFR BLD AUTO: 0.2 % — SIGNIFICANT CHANGE UP (ref 0–1.5)
LYMPHOCYTES # BLD AUTO: 3.01 K/UL — SIGNIFICANT CHANGE UP (ref 1–3.3)
LYMPHOCYTES # BLD AUTO: 35.7 % — SIGNIFICANT CHANGE UP (ref 13–44)
MCHC RBC-ENTMCNC: 29 PG — SIGNIFICANT CHANGE UP (ref 27–34)
MCHC RBC-ENTMCNC: 33.6 GM/DL — SIGNIFICANT CHANGE UP (ref 32–36)
MCV RBC AUTO: 86.2 FL — SIGNIFICANT CHANGE UP (ref 80–100)
MONOCYTES # BLD AUTO: 0.76 K/UL — SIGNIFICANT CHANGE UP (ref 0–0.9)
MONOCYTES NFR BLD AUTO: 9 % — SIGNIFICANT CHANGE UP (ref 2–14)
NEUTROPHILS # BLD AUTO: 4.36 K/UL — SIGNIFICANT CHANGE UP (ref 1.8–7.4)
NEUTROPHILS NFR BLD AUTO: 51.9 % — SIGNIFICANT CHANGE UP (ref 43–77)
PLATELET # BLD AUTO: 293 K/UL — SIGNIFICANT CHANGE UP (ref 150–400)
POTASSIUM SERPL-MCNC: 3.3 MMOL/L — LOW (ref 3.5–5.3)
POTASSIUM SERPL-SCNC: 3.3 MMOL/L — LOW (ref 3.5–5.3)
RBC # BLD: 4.35 M/UL — SIGNIFICANT CHANGE UP (ref 3.8–5.2)
RBC # FLD: 13.4 % — SIGNIFICANT CHANGE UP (ref 10.3–14.5)
SODIUM SERPL-SCNC: 139 MMOL/L — SIGNIFICANT CHANGE UP (ref 135–145)
TROPONIN T SERPL-MCNC: <0.01 NG/ML — SIGNIFICANT CHANGE UP (ref 0–0.06)
WBC # BLD: 8.42 K/UL — SIGNIFICANT CHANGE UP (ref 3.8–10.5)
WBC # FLD AUTO: 8.42 K/UL — SIGNIFICANT CHANGE UP (ref 3.8–10.5)

## 2021-08-25 PROCEDURE — 99285 EMERGENCY DEPT VISIT HI MDM: CPT

## 2021-08-25 PROCEDURE — 84484 ASSAY OF TROPONIN QUANT: CPT

## 2021-08-25 PROCEDURE — 93005 ELECTROCARDIOGRAM TRACING: CPT

## 2021-08-25 PROCEDURE — 93010 ELECTROCARDIOGRAM REPORT: CPT

## 2021-08-25 PROCEDURE — 85025 COMPLETE CBC W/AUTO DIFF WBC: CPT

## 2021-08-25 PROCEDURE — 96374 THER/PROPH/DIAG INJ IV PUSH: CPT

## 2021-08-25 PROCEDURE — 99284 EMERGENCY DEPT VISIT MOD MDM: CPT | Mod: 25

## 2021-08-25 PROCEDURE — 36415 COLL VENOUS BLD VENIPUNCTURE: CPT

## 2021-08-25 PROCEDURE — 80048 BASIC METABOLIC PNL TOTAL CA: CPT

## 2021-08-25 RX ORDER — FAMOTIDINE 10 MG/ML
20 INJECTION INTRAVENOUS ONCE
Refills: 0 | Status: COMPLETED | OUTPATIENT
Start: 2021-08-25 | End: 2021-08-25

## 2021-08-25 RX ORDER — NORETHINDRONE AND ETHINYL ESTRADIOL 0.4-0.035
1 KIT ORAL
Qty: 0 | Refills: 0 | DISCHARGE

## 2021-08-25 RX ADMIN — FAMOTIDINE 20 MILLIGRAM(S): 10 INJECTION INTRAVENOUS at 03:35

## 2021-08-25 RX ADMIN — Medication 30 MILLILITER(S): at 03:41

## 2021-08-25 NOTE — ED PROVIDER NOTE - PATIENT PORTAL LINK FT
You can access the FollowMyHealth Patient Portal offered by [facility] by registering at the following website: [email or web address]. By joining [employer]’s FollowMyHealth portal, you will also be able to view your health information using other applications (apps) compatible with our system.

## 2021-08-25 NOTE — ED PROVIDER NOTE - OBJECTIVE STATEMENT
Patient is a 47yo F states that she has had midline chest discomfort since Monday. She reports that it is constant, dull, and substernal with some radiation to the L chest. Patient reports that she tried tums but without relief. She states that she has a history of PFO repair and sees Dr. Cadet for cardiology. Patient states that her pain seems to be unrelated to meals or positional changes. Denies fevers, chills, sob, nausea, vomiting, diarrhea

## 2021-08-25 NOTE — ED PROVIDER NOTE - PHYSICAL EXAMINATION
General: Well appearing female in no acute distress  HEENT: Normocephalic, atraumatic. Moist mucous membranes. Oropharynx clear. No lymphadenopathy.  Eyes: No scleral icterus. No conjunctival pallor. EOMI. BONITA.  Neck: Soft and supple. Full ROM without pain. No midline tenderness  Cardiac: Regular rate and regular rhythm. No murmurs. No LE edema.  Resp: Lungs CTAB. No wheezes, rales or rhonchi.  Abd: Soft, non-tender, non-distended. No guarding or rebound. No scars, masses, or lesions.  Back: Spine midline and non-tender. No CVA tenderness.    Skin: No rashes, abrasions, or lacerations.  Neuro: AO x 3. Moves all extremities symmetrically. Motor strength and sensation grossly intact.

## 2021-08-25 NOTE — ED PROVIDER NOTE - NSICDXFAMILYHX_GEN_ALL_CORE_FT
FAMILY HISTORY:  Father  Still living? Unknown  Family history of pancreatic cancer, Age at diagnosis: Age Unknown  Family history of testicular cancer, Age at diagnosis: Age Unknown    Mother  Still living? Unknown  Family history of ovarian cancer, Age at diagnosis: Age Unknown    Grandparent  Still living? Unknown  Family history of stroke, Age at diagnosis: Age Unknown

## 2021-08-25 NOTE — ED ADULT NURSE NOTE - OBJECTIVE STATEMENT
46F aaox4 ambulatory with h/o PFO repair 2018, p/w c/o cramping sensation in the chest area for 3 days now. Recent travel to [facility] arrive here August 14, no sick contacts, denies any chills fever nausea or vomiting.

## 2021-08-25 NOTE — ED PROVIDER NOTE - ATTENDING CONTRIBUTION TO CARE
I personally saw the patient with the resident, and completed the key components of the history and physical exam. I then discussed the management plan with the resident.   gen in nad resp clear cardiac no mrumnur abd soft neiuro intact

## 2021-08-25 NOTE — ED PROVIDER NOTE - NS ED ROS FT
General: Denies fever, chills  HEENT: Denies visual changes, sore throat  Neck: Denies neck pain, neck stiffness  Resp: Denies coughing, SOB  Cardiovascular: Endorses CP  GI: Denies abdominal pain, nausea, vomiting, diarrhea  : Denies dysuria, hematuria, incontinence  MSK: Denies back pain  Neuro: Denies HA, dizziness, numbness, weakness  Skin: Denies rashes

## 2021-08-25 NOTE — ED ADULT NURSE NOTE - NS ED NURSE DISCH DISPOSITION
ALINE/KARLA Discharge Plan  Informed patient is ready for discharge. Patient’s discharge destination is Rehabilitation[facility]Skilled Care. Patient to be picked up by SNF wheelchair van at 1130. Patient/interested person has been counseled for post hospitalization care. Patient unable to agree with goals & plan, Family/S.O./Caregiver agrees. Initial implementation of the patient’s discharge plan has been arranged, including any devices/equipment needed for discharge. Discharge plan communicated to MD, RN, Receiving Facility/Agency and SARA.    ALINE called and spoke to APOA, who is aware and in agreement with discharge plan.     ALINE called and spoke to Radames ([phone]), admissions representative of [facility]. SNF is aware and in agreement with discharge plan. D/C summary and AVS transition report faxed to facility per hospital protocol.    RN is aware to please call [phone] to complete RN to RN report prior to discharge.    After Visit Summary - Transition Report Information  Family Member Name/Contact Number: Nataliya Neely; [phone]  Family Member Relationship: Activated Power of  for Health Care  Receiving Agency/Facility: [facility]  Receiving Agency/Facility phone number: [phone]  Receiving Agency/Facility fax number: [phone]  Receiving Agency/Facility address: [facility] [address]  Receiving Agency/Facility city/state: [place], [place]  Receiving Agency/Facility Type: Skilled Nursing Facility - Subacute   Discharged

## 2021-09-23 ENCOUNTER — APPOINTMENT (OUTPATIENT)
Dept: INTERNAL MEDICINE | Facility: CLINIC | Age: 46
End: 2021-09-23
Payer: MEDICAID

## 2021-09-23 VITALS
WEIGHT: 145 LBS | RESPIRATION RATE: 16 BRPM | TEMPERATURE: 97 F | SYSTOLIC BLOOD PRESSURE: 139 MMHG | HEART RATE: 93 BPM | HEIGHT: 64 IN | DIASTOLIC BLOOD PRESSURE: 87 MMHG | BODY MASS INDEX: 24.75 KG/M2

## 2021-09-23 DIAGNOSIS — R73.9 HYPERGLYCEMIA, UNSPECIFIED: ICD-10-CM

## 2021-09-23 DIAGNOSIS — E55.9 VITAMIN D DEFICIENCY, UNSPECIFIED: ICD-10-CM

## 2021-09-23 DIAGNOSIS — E78.9 DISORDER OF LIPOPROTEIN METABOLISM, UNSPECIFIED: ICD-10-CM

## 2021-09-23 DIAGNOSIS — E03.9 HYPOTHYROIDISM, UNSPECIFIED: ICD-10-CM

## 2021-09-23 PROCEDURE — 99396 PREV VISIT EST AGE 40-64: CPT | Mod: 25

## 2021-09-23 PROCEDURE — 36415 COLL VENOUS BLD VENIPUNCTURE: CPT

## 2021-09-23 NOTE — HEALTH RISK ASSESSMENT
[Good] : ~his/her~  mood as  good [Yes] : Yes [Monthly or less (1 pt)] : Monthly or less (1 point) [No] : In the past 12 months have you used drugs other than those required for medical reasons? No [0] : 2) Feeling down, depressed, or hopeless: Not at all (0) [Patient declined Low Dose CT Scan] : Patient declined Low Dose CT Scan [Patient declined Retinal Exam] : Patient declined Retinal Exam. [Patient reported mammogram was normal] : Patient reported mammogram was normal [Patient reported PAP Smear was normal] : Patient reported PAP Smear was normal [Patient reported colonoscopy was normal] : Patient reported colonoscopy was normal [HIV test declined] : HIV test declined [Hepatitis C test declined] : Hepatitis C test declined [] : No [de-identified] : cardiologist, OB/GYN [Audit-CScore] : 0 [[id number]] : 0 [Patient declined bone density test] : Patient declined bone density test [MammogramDate] : 07/21 [PapSmearDate] : 06/21 [ColonoscopyDate] : 09/14

## 2021-09-23 NOTE — HISTORY OF PRESENT ILLNESS
[FreeTextEntry1] : physical exam  [de-identified] : Ms. TANO JULIEN is a 46 year female comes to the office for physical exam. Patient feels well and has no complaints at this time.

## 2021-09-24 LAB
25(OH)D3 SERPL-MCNC: 45.5 NG/ML
ALBUMIN SERPL ELPH-MCNC: 4.8 G/DL
ALP BLD-CCNC: 51 U/L
ALT SERPL-CCNC: 11 U/L
ANION GAP SERPL CALC-SCNC: 13 MMOL/L
AST SERPL-CCNC: 16 U/L
BASOPHILS # BLD AUTO: 0.07 K/UL
BASOPHILS NFR BLD AUTO: 0.8 %
BILIRUB SERPL-MCNC: 0.3 MG/DL
BUN SERPL-MCNC: 17 MG/DL
CALCIUM SERPL-MCNC: 10.4 MG/DL
CHLORIDE SERPL-SCNC: 100 MMOL/L
CHOLEST SERPL-MCNC: 213 MG/DL
CO2 SERPL-SCNC: 25 MMOL/L
CREAT SERPL-MCNC: 0.84 MG/DL
EOSINOPHIL # BLD AUTO: 0.25 K/UL
EOSINOPHIL NFR BLD AUTO: 3 %
ESTIMATED AVERAGE GLUCOSE: 105 MG/DL
GLUCOSE SERPL-MCNC: 81 MG/DL
HBA1C MFR BLD HPLC: 5.3 %
HCT VFR BLD CALC: 40.1 %
HDLC SERPL-MCNC: 65 MG/DL
HGB BLD-MCNC: 13.2 G/DL
IMM GRANULOCYTES NFR BLD AUTO: 0.2 %
LDLC SERPL CALC-MCNC: 121 MG/DL
LYMPHOCYTES # BLD AUTO: 2.93 K/UL
LYMPHOCYTES NFR BLD AUTO: 34.9 %
MAN DIFF?: NORMAL
MCHC RBC-ENTMCNC: 29.1 PG
MCHC RBC-ENTMCNC: 32.9 GM/DL
MCV RBC AUTO: 88.5 FL
MONOCYTES # BLD AUTO: 0.61 K/UL
MONOCYTES NFR BLD AUTO: 7.3 %
NEUTROPHILS # BLD AUTO: 4.51 K/UL
NEUTROPHILS NFR BLD AUTO: 53.8 %
NONHDLC SERPL-MCNC: 148 MG/DL
PLATELET # BLD AUTO: 318 K/UL
POTASSIUM SERPL-SCNC: 4.5 MMOL/L
PROT SERPL-MCNC: 7.4 G/DL
RBC # BLD: 4.53 M/UL
RBC # FLD: 13.6 %
SODIUM SERPL-SCNC: 138 MMOL/L
TRIGL SERPL-MCNC: 133 MG/DL
TSH SERPL-ACNC: 1.42 UIU/ML
WBC # FLD AUTO: 8.39 K/UL

## 2021-10-04 ENCOUNTER — TRANSCRIPTION ENCOUNTER (OUTPATIENT)
Age: 46
End: 2021-10-04

## 2021-10-05 ENCOUNTER — APPOINTMENT (OUTPATIENT)
Dept: ORTHOPEDIC SURGERY | Facility: CLINIC | Age: 46
End: 2021-10-05
Payer: MEDICAID

## 2021-10-05 DIAGNOSIS — S93.409A SPRAIN OF UNSPECIFIED LIGAMENT OF UNSPECIFIED ANKLE, INITIAL ENCOUNTER: ICD-10-CM

## 2021-10-05 PROCEDURE — 99204 OFFICE O/P NEW MOD 45 MIN: CPT

## 2021-10-05 PROCEDURE — 73610 X-RAY EXAM OF ANKLE: CPT | Mod: LT

## 2021-10-05 NOTE — DISCUSSION/SUMMARY
[Medication Risks Reviewed] : Medication risks reviewed [de-identified] : Patient is a 42 for a female with an ankle sprain of her left ankle.  I recommended conservative treatment at this time.  She should utilize rest ice compression and elevation at this time.  We have given her an Aircast today.  I have also given her prescription for physical therapy.  I will have her follow-up with me in 5 weeks for repeat examination.  All questions asked and answered.  She should take NSAIDs as needed for the pain.

## 2021-10-05 NOTE — HISTORY OF PRESENT ILLNESS
[de-identified] : Patient is a 46-year-old female presents with a chief complaint of left ankle pain after a twisting injury approximately 10 days ago.  Patient was seen at an outside urgent care where she was told she had an ankle sprain.  She has been ambulating since that time.  Complains of pain and swelling over the lateral aspect of her left ankle.  Denies any instability.  Denies any knee pain.

## 2021-10-05 NOTE — PHYSICAL EXAM
[de-identified] : GENERAL APPEARANCE: Well nourished and hydrated, pleasant, alert, and oriented x 3. Appears their stated age. \par HEENT: Normocephalic, extraocular eye motion intact. Nasal septum midline. Oral cavity clear. External auditory canal clear. \par RESPIRATORY: Breath sounds clear and audible in all lobes. No wheezing, No accessory muscle use.\par CARDIOVASCULAR: No apparent abnormalities. No lower leg edema. No varicosities. Pedal pulses are palpable.\par NEUROLOGIC: Sensation is normal, no muscle weakness in the upper or lower extremities.\par DERMATOLOGIC: No apparent skin lesions, moist, warm, no rash.\par SPINE: Cervical spine appears normal and moves freely; thoracic spine appears normal and moves freely; lumbosacral spine appears normal and moves freely, normal, nontender.\par MUSCULOSKELETAL: Hands, wrists, and elbows are normal and move freely, shoulders are normal and move freely. \par Psychiatric: Oriented to person, place, and time, insight and judgement were intact and the affect was normal. \par Left lower extremity: Positive edema over the lateral malleolus, tender to palpation over the ATFL, ankle full range of motion with pains at extreme, negative anterior drawer, nontender palpation over the medial mall, nontender palpation over the midfoot hindfoot and forefoot, foot warm well-perfused brisk cap refill [de-identified] : 3 views of the left ankle obtained in the office today show no acute fractures or dislocations.  There is soft tissue swelling noted over the lateral malleolus.

## 2021-11-05 ENCOUNTER — TRANSCRIPTION ENCOUNTER (OUTPATIENT)
Age: 46
End: 2021-11-05

## 2021-11-09 ENCOUNTER — APPOINTMENT (OUTPATIENT)
Dept: ORTHOPEDIC SURGERY | Facility: CLINIC | Age: 46
End: 2021-11-09

## 2021-11-11 ENCOUNTER — TRANSCRIPTION ENCOUNTER (OUTPATIENT)
Age: 46
End: 2021-11-11

## 2021-11-13 ENCOUNTER — TRANSCRIPTION ENCOUNTER (OUTPATIENT)
Age: 46
End: 2021-11-13

## 2021-11-16 ENCOUNTER — NON-APPOINTMENT (OUTPATIENT)
Age: 46
End: 2021-11-16

## 2021-11-16 DIAGNOSIS — Z87.898 PERSONAL HISTORY OF OTHER SPECIFIED CONDITIONS: ICD-10-CM

## 2021-11-16 DIAGNOSIS — G43.909 MIGRAINE, UNSPECIFIED, NOT INTRACTABLE, W/OUT STATUS MIGRAINOSUS: ICD-10-CM

## 2021-11-16 DIAGNOSIS — R10.84 GENERALIZED ABDOMINAL PAIN: ICD-10-CM

## 2021-11-16 DIAGNOSIS — R10.31 RIGHT LOWER QUADRANT PAIN: ICD-10-CM

## 2021-11-17 ENCOUNTER — LABORATORY RESULT (OUTPATIENT)
Age: 46
End: 2021-11-17

## 2021-11-17 ENCOUNTER — APPOINTMENT (OUTPATIENT)
Dept: OBGYN | Facility: CLINIC | Age: 46
End: 2021-11-17
Payer: MEDICAID

## 2021-11-17 VITALS
WEIGHT: 150 LBS | SYSTOLIC BLOOD PRESSURE: 124 MMHG | BODY MASS INDEX: 25.61 KG/M2 | HEIGHT: 64 IN | DIASTOLIC BLOOD PRESSURE: 82 MMHG

## 2021-11-17 DIAGNOSIS — Z00.00 ENCOUNTER FOR GENERAL ADULT MEDICAL EXAMINATION W/OUT ABNORMAL FINDINGS: ICD-10-CM

## 2021-11-17 DIAGNOSIS — B97.7 PAPILLOMAVIRUS AS THE CAUSE OF DISEASES CLASSIFIED ELSEWHERE: ICD-10-CM

## 2021-11-17 DIAGNOSIS — B96.89 ACUTE VAGINITIS: ICD-10-CM

## 2021-11-17 DIAGNOSIS — N76.0 ACUTE VAGINITIS: ICD-10-CM

## 2021-11-17 PROCEDURE — 99214 OFFICE O/P EST MOD 30 MIN: CPT

## 2021-11-17 NOTE — PLAN
[FreeTextEntry1] : We will treat BV with MetroGel vaginal for 5 days patient will return in 6 months for repeat Pap smear we will renew Junel 1/20 FE all risk benefits pros cons discussed in layman's terms patient agreeable to care

## 2021-11-17 NOTE — HISTORY OF PRESENT ILLNESS
[Patient reported PAP Smear was normal] : Patient reported PAP Smear was normal [Y] : Patient uses contraception [N] : Patient is not sexually active [Currently Active] : currently active [Men] : men [Vaginal] : vaginal [No] : No [FreeTextEntry1] : Patient here for follow-up birth control Junel  with improvement in headaches however patient has noted irregular spotting this month however desires to stay on same dose as the headaches have improved Pap smears been repeated secondary to history of HPV positive with normal cytology she denies DVT PE no smoking or changes in medication history [TextBox_4] : PT HERE FOR REPAP AND TO REFILL JUNEL FE 1/20 [PapSmeardate] : 06/04/201 [TextBox_31] : History of HPV normal cytology [TextBox_78] : HX HPV [MensesFreq] : 28 [MensesLength] : 3

## 2021-11-17 NOTE — PHYSICAL EXAM
[Examination Of The Breasts] : a normal appearance [No Masses] : no breast masses were palpable [Labia Majora] : normal [Labia Minora] : normal [Discharge] : a  ~M vaginal discharge was present [Moderate] : moderate [Green] : green [Thick] : thick [Mucopurulent] : mucopurulent [Normal] : normal [Uterine Adnexae] : normal [FreeTextEntry1] : Pap done

## 2021-11-22 LAB
C TRACH RRNA SPEC QL NAA+PROBE: NOT DETECTED
N GONORRHOEA RRNA SPEC QL NAA+PROBE: NOT DETECTED
SOURCE TP AMPLIFICATION: NORMAL

## 2021-11-24 DIAGNOSIS — Z78.9 OTHER SPECIFIED HEALTH STATUS: ICD-10-CM

## 2021-11-24 LAB — CYTOLOGY CVX/VAG DOC THIN PREP: NORMAL

## 2021-11-30 PROBLEM — Z78.9 HISTORY OF PRIOR PREGNANCIES: Status: RESOLVED | Noted: 2021-11-30 | Resolved: 2021-11-30

## 2021-12-01 ENCOUNTER — NON-APPOINTMENT (OUTPATIENT)
Age: 46
End: 2021-12-01

## 2021-12-01 DIAGNOSIS — Z80.3 FAMILY HISTORY OF MALIGNANT NEOPLASM OF BREAST: ICD-10-CM

## 2021-12-01 DIAGNOSIS — R87.611 ATYPICAL SQUAMOUS CELLS CANNOT EXCLUDE HIGH GRADE SQUAMOUS INTRAEPITHELIAL LESION ON CYTOLOGIC SMEAR OF CERVIX (ASC-H): ICD-10-CM

## 2021-12-15 ENCOUNTER — TRANSCRIPTION ENCOUNTER (OUTPATIENT)
Age: 46
End: 2021-12-15

## 2021-12-17 ENCOUNTER — NON-APPOINTMENT (OUTPATIENT)
Age: 46
End: 2021-12-17

## 2022-04-27 ENCOUNTER — APPOINTMENT (OUTPATIENT)
Dept: OBGYN | Facility: CLINIC | Age: 47
End: 2022-04-27
Payer: MEDICAID

## 2022-04-27 VITALS
BODY MASS INDEX: 24.75 KG/M2 | DIASTOLIC BLOOD PRESSURE: 72 MMHG | WEIGHT: 145 LBS | HEIGHT: 64 IN | SYSTOLIC BLOOD PRESSURE: 118 MMHG

## 2022-04-27 DIAGNOSIS — Z78.9 OTHER SPECIFIED HEALTH STATUS: ICD-10-CM

## 2022-04-27 DIAGNOSIS — Z12.39 ENCOUNTER FOR OTHER SCREENING FOR MALIGNANT NEOPLASM OF BREAST: ICD-10-CM

## 2022-04-27 PROCEDURE — 99396 PREV VISIT EST AGE 40-64: CPT

## 2022-04-27 NOTE — HISTORY OF PRESENT ILLNESS
[Patient reported mammogram was normal] : Patient reported mammogram was normal [Patient reported breast sonogram was normal] : Patient reported breast sonogram was normal [Patient reported PAP Smear was normal] : Patient reported PAP Smear was normal [HIV test declined] : HIV test declined [Syphilis test declined] : Syphilis test declined [Gonorrhea test declined] : Gonorrhea test declined [Chlamydia test declined] : Chlamydia test declined [Trichomonas test declined] : Trichomonas test declined [Hepatitis B test declined] : Hepatitis B test declined [Hepatitis C test declined] : Hepatitis C test declined [N] : Patient reports normal menses [Y] : Positive pregnancy history [N/A] : pregnancy not applicable [On BCP at conception] : the patient was on BCP at conception [Currently Active] : currently active [Men] : men [Vaginal] : vaginal [No] : No [Yes] : Yes [TextBox_4] : PT HERE FOR ANNUAL EXAM AND TO REFILL JUNEL FE 1/20. [Mammogramdate] : 07/07/21 [BreastSonogramDate] : 07/07/21 [PapSmeardate] : 11/17/21 [TextBox_31] : 06/04/21 WNL 12/04/20 WNL 05/12/20 ASCUS HPV+ [HPVDate] : 2020 [LMPDate] : N/A [PGHxTotal] : 3 [[facility] Hospit[facility]Term] : 3 [[facility] Centeriving] : 3 [TextBox_6] : N/A [TextBox_9] : 11 [FreeTextEntry1] : N/A [FreeTextEntry3] : OCP

## 2022-04-27 NOTE — DISCUSSION/SUMMARY
[FreeTextEntry1] : Normal GYN annual exam we will continue Junel 1/20 FE again all risk benefits pros cons discussed, will have mammogram bilateral breast sono recommend colonoscopy at age 47 continue vitamin D3 multivitamin breast self-exam taught return in 6 months for follow-up

## 2022-04-27 NOTE — PHYSICAL EXAM

## 2022-04-29 LAB
C TRACH RRNA SPEC QL NAA+PROBE: NOT DETECTED
HPV HIGH+LOW RISK DNA PNL CVX: NOT DETECTED
N GONORRHOEA RRNA SPEC QL NAA+PROBE: NOT DETECTED
SOURCE TP AMPLIFICATION: NORMAL

## 2022-05-03 LAB — CYTOLOGY CVX/VAG DOC THIN PREP: NORMAL

## 2022-08-03 ENCOUNTER — NON-APPOINTMENT (OUTPATIENT)
Age: 47
End: 2022-08-03

## 2022-11-01 ENCOUNTER — APPOINTMENT (OUTPATIENT)
Dept: OBGYN | Facility: CLINIC | Age: 47
End: 2022-11-01

## 2022-11-01 ENCOUNTER — NON-APPOINTMENT (OUTPATIENT)
Age: 47
End: 2022-11-01

## 2022-11-01 VITALS
WEIGHT: 145 LBS | DIASTOLIC BLOOD PRESSURE: 70 MMHG | HEIGHT: 64 IN | BODY MASS INDEX: 24.75 KG/M2 | SYSTOLIC BLOOD PRESSURE: 122 MMHG

## 2022-11-01 PROCEDURE — 99213 OFFICE O/P EST LOW 20 MIN: CPT

## 2022-11-01 NOTE — HISTORY OF PRESENT ILLNESS
[N] : Patient reports normal menses [Y] : Positive pregnancy history [N/A] : pregnancy not applicable [Currently Active] : currently active [Men] : men [Vaginal] : vaginal [No] : No [Yes] : Yes [TextBox_4] : PT HERE FOR BC RENEWAL PT IS ON JUNEL FE 1/20 [LMPDate] : N/A [PGHxTotal] : 3 [[facility] Hospit[facility]Term] : 3 [[facility] Campusiving] : 3 [TextBox_6] : N/A [TextBox_9] : 11 [FreeTextEntry1] : N/A [FreeTextEntry3] : PILLS

## 2022-11-12 ENCOUNTER — EMERGENCY (EMERGENCY)
Facility: HOSPITAL | Age: 47
LOS: 1 days | Discharge: HOME | End: 2022-11-12
Attending: EMERGENCY MEDICINE
Payer: MEDICAID

## 2022-11-12 VITALS
RESPIRATION RATE: 18 BRPM | DIASTOLIC BLOOD PRESSURE: 103 MMHG | SYSTOLIC BLOOD PRESSURE: 167 MMHG | TEMPERATURE: 98 F | HEART RATE: 117 BPM | WEIGHT: 149.91 LBS | OXYGEN SATURATION: 98 %

## 2022-11-12 VITALS
TEMPERATURE: 98 F | RESPIRATION RATE: 18 BRPM | DIASTOLIC BLOOD PRESSURE: 79 MMHG | OXYGEN SATURATION: 95 % | HEART RATE: 90 BPM | SYSTOLIC BLOOD PRESSURE: 148 MMHG

## 2022-11-12 DIAGNOSIS — Z98.89 OTHER SPECIFIED POSTPROCEDURAL STATES: Chronic | ICD-10-CM

## 2022-11-12 LAB
ALBUMIN SERPL ELPH-MCNC: 4.6 G/DL — SIGNIFICANT CHANGE UP (ref 3.3–5.2)
ALP SERPL-CCNC: 57 U/L — SIGNIFICANT CHANGE UP (ref 40–120)
ALT FLD-CCNC: 12 U/L — SIGNIFICANT CHANGE UP
AMMONIA BLD-MCNC: 14 UMOL/L — SIGNIFICANT CHANGE UP (ref 11–55)
AMPHET UR-MCNC: NEGATIVE — SIGNIFICANT CHANGE UP
ANION GAP SERPL CALC-SCNC: 14 MMOL/L — SIGNIFICANT CHANGE UP (ref 5–17)
APPEARANCE UR: CLEAR — SIGNIFICANT CHANGE UP
APTT BLD: 29.8 SEC — SIGNIFICANT CHANGE UP (ref 27.5–35.5)
AST SERPL-CCNC: 17 U/L — SIGNIFICANT CHANGE UP
BACTERIA # UR AUTO: NEGATIVE — SIGNIFICANT CHANGE UP
BARBITURATES UR SCN-MCNC: NEGATIVE — SIGNIFICANT CHANGE UP
BASE EXCESS BLDV CALC-SCNC: 0 MMOL/L — SIGNIFICANT CHANGE UP (ref -2–3)
BASOPHILS # BLD AUTO: 0.07 K/UL — SIGNIFICANT CHANGE UP (ref 0–0.2)
BASOPHILS NFR BLD AUTO: 1.2 % — SIGNIFICANT CHANGE UP (ref 0–2)
BENZODIAZ UR-MCNC: NEGATIVE — SIGNIFICANT CHANGE UP
BILIRUB SERPL-MCNC: 0.5 MG/DL — SIGNIFICANT CHANGE UP (ref 0.4–2)
BILIRUB UR-MCNC: NEGATIVE — SIGNIFICANT CHANGE UP
BUN SERPL-MCNC: 13.4 MG/DL — SIGNIFICANT CHANGE UP (ref 8–20)
CA-I SERPL-SCNC: 1.14 MMOL/L — LOW (ref 1.15–1.33)
CALCIUM SERPL-MCNC: 9.4 MG/DL — SIGNIFICANT CHANGE UP (ref 8.4–10.5)
CHLORIDE BLDV-SCNC: 99 MMOL/L — SIGNIFICANT CHANGE UP (ref 96–108)
CHLORIDE SERPL-SCNC: 103 MMOL/L — SIGNIFICANT CHANGE UP (ref 96–108)
CO2 SERPL-SCNC: 23 MMOL/L — SIGNIFICANT CHANGE UP (ref 22–29)
COCAINE METAB.OTHER UR-MCNC: NEGATIVE — SIGNIFICANT CHANGE UP
COLOR SPEC: YELLOW — SIGNIFICANT CHANGE UP
CREAT SERPL-MCNC: 0.81 MG/DL — SIGNIFICANT CHANGE UP (ref 0.5–1.3)
DIFF PNL FLD: ABNORMAL
EGFR: 91 ML/MIN/1.73M2 — SIGNIFICANT CHANGE UP
EOSINOPHIL # BLD AUTO: 0.19 K/UL — SIGNIFICANT CHANGE UP (ref 0–0.5)
EOSINOPHIL NFR BLD AUTO: 3.3 % — SIGNIFICANT CHANGE UP (ref 0–6)
EPI CELLS # UR: SIGNIFICANT CHANGE UP
ETHANOL SERPL-MCNC: <10 MG/DL — SIGNIFICANT CHANGE UP (ref 0–9)
GAS PNL BLDV: 132 MMOL/L — LOW (ref 136–145)
GAS PNL BLDV: SIGNIFICANT CHANGE UP
GAS PNL BLDV: SIGNIFICANT CHANGE UP
GLUCOSE BLDV-MCNC: 91 MG/DL — SIGNIFICANT CHANGE UP (ref 70–99)
GLUCOSE SERPL-MCNC: 95 MG/DL — SIGNIFICANT CHANGE UP (ref 70–99)
GLUCOSE UR QL: NEGATIVE MG/DL — SIGNIFICANT CHANGE UP
HCG UR QL: NEGATIVE — SIGNIFICANT CHANGE UP
HCO3 BLDV-SCNC: 25 MMOL/L — SIGNIFICANT CHANGE UP (ref 22–29)
HCT VFR BLD CALC: 40.6 % — SIGNIFICANT CHANGE UP (ref 34.5–45)
HCT VFR BLDA CALC: 38 % — SIGNIFICANT CHANGE UP
HGB BLD CALC-MCNC: 12.6 G/DL — SIGNIFICANT CHANGE UP (ref 11.7–16.1)
HGB BLD-MCNC: 14.2 G/DL — SIGNIFICANT CHANGE UP (ref 11.5–15.5)
IMM GRANULOCYTES NFR BLD AUTO: 0.2 % — SIGNIFICANT CHANGE UP (ref 0–0.9)
INR BLD: 1.01 RATIO — SIGNIFICANT CHANGE UP (ref 0.88–1.16)
KETONES UR-MCNC: NEGATIVE — SIGNIFICANT CHANGE UP
LACTATE BLDV-MCNC: 1.4 MMOL/L — SIGNIFICANT CHANGE UP (ref 0.5–2)
LACTATE SERPL-SCNC: 1.4 MMOL/L — SIGNIFICANT CHANGE UP (ref 0.5–2)
LEUKOCYTE ESTERASE UR-ACNC: NEGATIVE — SIGNIFICANT CHANGE UP
LYMPHOCYTES # BLD AUTO: 2.19 K/UL — SIGNIFICANT CHANGE UP (ref 1–3.3)
LYMPHOCYTES # BLD AUTO: 38.2 % — SIGNIFICANT CHANGE UP (ref 13–44)
MCHC RBC-ENTMCNC: 29.5 PG — SIGNIFICANT CHANGE UP (ref 27–34)
MCHC RBC-ENTMCNC: 35 GM/DL — SIGNIFICANT CHANGE UP (ref 32–36)
MCV RBC AUTO: 84.4 FL — SIGNIFICANT CHANGE UP (ref 80–100)
METHADONE UR-MCNC: NEGATIVE — SIGNIFICANT CHANGE UP
MONOCYTES # BLD AUTO: 0.42 K/UL — SIGNIFICANT CHANGE UP (ref 0–0.9)
MONOCYTES NFR BLD AUTO: 7.3 % — SIGNIFICANT CHANGE UP (ref 2–14)
NEUTROPHILS # BLD AUTO: 2.85 K/UL — SIGNIFICANT CHANGE UP (ref 1.8–7.4)
NEUTROPHILS NFR BLD AUTO: 49.8 % — SIGNIFICANT CHANGE UP (ref 43–77)
NITRITE UR-MCNC: NEGATIVE — SIGNIFICANT CHANGE UP
OPIATES UR-MCNC: NEGATIVE — SIGNIFICANT CHANGE UP
PCO2 BLDV: 40 MMHG — SIGNIFICANT CHANGE UP (ref 39–42)
PCP SPEC-MCNC: SIGNIFICANT CHANGE UP
PCP UR-MCNC: NEGATIVE — SIGNIFICANT CHANGE UP
PH BLDV: 7.4 — SIGNIFICANT CHANGE UP (ref 7.32–7.43)
PH UR: 7 — SIGNIFICANT CHANGE UP (ref 5–8)
PLATELET # BLD AUTO: 350 K/UL — SIGNIFICANT CHANGE UP (ref 150–400)
PO2 BLDV: 47 MMHG — HIGH (ref 25–45)
POTASSIUM BLDV-SCNC: 3.7 MMOL/L — SIGNIFICANT CHANGE UP (ref 3.5–5.1)
POTASSIUM SERPL-MCNC: 3.7 MMOL/L — SIGNIFICANT CHANGE UP (ref 3.5–5.3)
POTASSIUM SERPL-SCNC: 3.7 MMOL/L — SIGNIFICANT CHANGE UP (ref 3.5–5.3)
PROT SERPL-MCNC: 7.9 G/DL — SIGNIFICANT CHANGE UP (ref 6.6–8.7)
PROT UR-MCNC: NEGATIVE — SIGNIFICANT CHANGE UP
PROTHROM AB SERPL-ACNC: 11.7 SEC — SIGNIFICANT CHANGE UP (ref 10.5–13.4)
RAPID RVP RESULT: SIGNIFICANT CHANGE UP
RBC # BLD: 4.81 M/UL — SIGNIFICANT CHANGE UP (ref 3.8–5.2)
RBC # FLD: 13.1 % — SIGNIFICANT CHANGE UP (ref 10.3–14.5)
RBC CASTS # UR COMP ASSIST: ABNORMAL /HPF (ref 0–4)
SAO2 % BLDV: 79.4 % — SIGNIFICANT CHANGE UP
SARS-COV-2 RNA SPEC QL NAA+PROBE: SIGNIFICANT CHANGE UP
SODIUM SERPL-SCNC: 139 MMOL/L — SIGNIFICANT CHANGE UP (ref 135–145)
SP GR SPEC: 1 — LOW (ref 1.01–1.02)
THC UR QL: NEGATIVE — SIGNIFICANT CHANGE UP
TROPONIN T SERPL-MCNC: <0.01 NG/ML — SIGNIFICANT CHANGE UP (ref 0–0.06)
UROBILINOGEN FLD QL: NEGATIVE MG/DL — SIGNIFICANT CHANGE UP
WBC # BLD: 5.73 K/UL — SIGNIFICANT CHANGE UP (ref 3.8–10.5)
WBC # FLD AUTO: 5.73 K/UL — SIGNIFICANT CHANGE UP (ref 3.8–10.5)
WBC UR QL: NEGATIVE /HPF — SIGNIFICANT CHANGE UP (ref 0–5)

## 2022-11-12 PROCEDURE — 83605 ASSAY OF LACTIC ACID: CPT

## 2022-11-12 PROCEDURE — 85730 THROMBOPLASTIN TIME PARTIAL: CPT

## 2022-11-12 PROCEDURE — 85025 COMPLETE CBC W/AUTO DIFF WBC: CPT

## 2022-11-12 PROCEDURE — 82947 ASSAY GLUCOSE BLOOD QUANT: CPT

## 2022-11-12 PROCEDURE — 82140 ASSAY OF AMMONIA: CPT

## 2022-11-12 PROCEDURE — 70498 CT ANGIOGRAPHY NECK: CPT | Mod: MA

## 2022-11-12 PROCEDURE — 85610 PROTHROMBIN TIME: CPT

## 2022-11-12 PROCEDURE — 71045 X-RAY EXAM CHEST 1 VIEW: CPT

## 2022-11-12 PROCEDURE — 82330 ASSAY OF CALCIUM: CPT

## 2022-11-12 PROCEDURE — 99220: CPT

## 2022-11-12 PROCEDURE — 70551 MRI BRAIN STEM W/O DYE: CPT | Mod: MA

## 2022-11-12 PROCEDURE — 81025 URINE PREGNANCY TEST: CPT

## 2022-11-12 PROCEDURE — 70496 CT ANGIOGRAPHY HEAD: CPT | Mod: MA

## 2022-11-12 PROCEDURE — 84295 ASSAY OF SERUM SODIUM: CPT

## 2022-11-12 PROCEDURE — 80053 COMPREHEN METABOLIC PANEL: CPT

## 2022-11-12 PROCEDURE — 84132 ASSAY OF SERUM POTASSIUM: CPT

## 2022-11-12 PROCEDURE — 70496 CT ANGIOGRAPHY HEAD: CPT | Mod: 26,MA

## 2022-11-12 PROCEDURE — 0042T: CPT | Mod: MA

## 2022-11-12 PROCEDURE — 80307 DRUG TEST PRSMV CHEM ANLYZR: CPT

## 2022-11-12 PROCEDURE — 85018 HEMOGLOBIN: CPT

## 2022-11-12 PROCEDURE — 82803 BLOOD GASES ANY COMBINATION: CPT

## 2022-11-12 PROCEDURE — 81001 URINALYSIS AUTO W/SCOPE: CPT

## 2022-11-12 PROCEDURE — 84484 ASSAY OF TROPONIN QUANT: CPT

## 2022-11-12 PROCEDURE — G0378: CPT

## 2022-11-12 PROCEDURE — 70551 MRI BRAIN STEM W/O DYE: CPT | Mod: 26,MA

## 2022-11-12 PROCEDURE — 70498 CT ANGIOGRAPHY NECK: CPT | Mod: 26,MA

## 2022-11-12 PROCEDURE — 71045 X-RAY EXAM CHEST 1 VIEW: CPT | Mod: 26

## 2022-11-12 PROCEDURE — 82435 ASSAY OF BLOOD CHLORIDE: CPT

## 2022-11-12 PROCEDURE — 36415 COLL VENOUS BLD VENIPUNCTURE: CPT

## 2022-11-12 PROCEDURE — 0225U NFCT DS DNA&RNA 21 SARSCOV2: CPT

## 2022-11-12 PROCEDURE — 93005 ELECTROCARDIOGRAM TRACING: CPT

## 2022-11-12 PROCEDURE — 85379 FIBRIN DEGRADATION QUANT: CPT

## 2022-11-12 PROCEDURE — 93010 ELECTROCARDIOGRAM REPORT: CPT

## 2022-11-12 PROCEDURE — 85014 HEMATOCRIT: CPT

## 2022-11-12 PROCEDURE — 70450 CT HEAD/BRAIN W/O DYE: CPT | Mod: MA

## 2022-11-12 PROCEDURE — 82962 GLUCOSE BLOOD TEST: CPT

## 2022-11-12 PROCEDURE — 99214 OFFICE O/P EST MOD 30 MIN: CPT

## 2022-11-12 PROCEDURE — 99285 EMERGENCY DEPT VISIT HI MDM: CPT | Mod: 25

## 2022-11-12 RX ORDER — ALPRAZOLAM 0.25 MG
0.25 TABLET ORAL ONCE
Refills: 0 | Status: DISCONTINUED | OUTPATIENT
Start: 2022-11-12 | End: 2022-11-12

## 2022-11-12 RX ORDER — ASPIRIN/CALCIUM CARB/MAGNESIUM 324 MG
324 TABLET ORAL ONCE
Refills: 0 | Status: COMPLETED | OUTPATIENT
Start: 2022-11-12 | End: 2022-11-12

## 2022-11-12 RX ADMIN — Medication 324 MILLIGRAM(S): at 11:35

## 2022-11-12 RX ADMIN — Medication 0.25 MILLIGRAM(S): at 12:07

## 2022-11-12 NOTE — ED PROVIDER NOTE - CLINICAL SUMMARY MEDICAL DECISION MAKING FREE TEXT BOX
pt outside tpa window and NIHSS 0 with no clinically significant symptoms, will not give tpa, do ct imaging, work up and likely MRI

## 2022-11-12 NOTE — ED CDU PROVIDER INITIAL DAY NOTE - OBJECTIVE STATEMENT
48 yo female hx endometriosis, on OCP, TIA in past, PFO  with repair, chronic vestibular migraines, presenting to the ED with left sided facial numbness since 5 or 6 pm last evening. denies visual changes slurred speech, abnormal headache, numbness or tingling to extremities drooling or changes to speech. states that she has similar symptoms in 2018 when she was dx with TIA and PFO. states that she baseline has left upper arm numbness. no better or worse than her normal. states that symptoms persisted to this morning prompting her to come to the ED. pt also reporting sob but attributing it to being out of shape. non smoker no hx of DVT or PE. mom recently with STROKE and found to have antiphospholipid syndrome/+ anticardiolipin AB.   denies chest pain. KNUTSON. leg swelling 47 year old female hx endometriosis, on OCP, TIA in past, PFO  with repair, chronic vestibular migraines, presenting to the ED with left sided facial numbness since 5 or 6 pm last evening. denies visual changes slurred speech, abnormal headache, numbness or tingling to extremities drooling or changes to speech. states that she has similar symptoms in 2018 when she was dx with TIA and PFO. states that she baseline has left upper arm numbness. no better or worse than her normal. states that symptoms persisted to this morning prompting her to come to the ED. pt also reporting sob but attributing it to being out of shape. non smoker no hx of DVT or PE. mom recently with STROKE and found to have antiphospholipid syndrome/+ anticardiolipin AB.   denies chest pain. KNUTSON. leg swelling

## 2022-11-12 NOTE — ED CDU PROVIDER DISPOSITION NOTE - CLINICAL COURSE
48 yo female hx of TIA, PFO with repair, migraines, presented to the ED with left facial numbness since yesterday evening. no focal deficits on examination, placed in observation for MRI and neurology consult. MRI without acute stroke + sinus congestion. neurology cleared. dc with fu outpatient. advised on fu with pcp in regards to mothers recent + antiphos dx for outpatient coagulation work up   strict return precautions given

## 2022-11-12 NOTE — ED CDU PROVIDER INITIAL DAY NOTE - NS ED ATTENDING STATEMENT MOD
This was a shared visit with the AURY. I reviewed and verified the documentation and independently performed the documented:

## 2022-11-12 NOTE — ED ADULT TRIAGE NOTE - CHIEF COMPLAINT QUOTE
left sided facial numbness last night, left sided body numbness, left arm weakness onset this AM, headache, dizziness . denies slurred speech/visual changes. hx PFO, vertigo.

## 2022-11-12 NOTE — ED CDU PROVIDER INITIAL DAY NOTE - PHYSICAL EXAMINATION
Gen: Well appearing in NAD  Head: NC/AT, subjective left facial numbness  SHERRIE , no facial drooping, slurred speech.   Neck: trachea midline  Resp:  No distress  Ext: no deformities FROM. ambulatory stable gait   Neuro:  A&O appears non focal  Skin:  Warm and dry as visualized  Psych:  Normal affect and mood

## 2022-11-12 NOTE — ED CDU PROVIDER DISPOSITION NOTE - ATTENDING CONTRIBUTION TO CARE
I agree with the PA's note and was available for any issues/concerns. I was directly involved in patient care. My brief overall assessment is as follows:    labs and diagnostic imaging results reviewed with patient; symptoms improved; patient feels comfortable with discharge and medical plan; PMD or clinic follow up recommended for reassessment. Patient is aware of signs/symptoms to return to the emergency department.

## 2022-11-12 NOTE — CONSULT NOTE ADULT - ASSESSMENT
The patient is a 47y Female with paresthesias now resolved.     Paresthesias.   Agree that TIA is in differential diagnosis.  Also in differential diagnosis would be migraine phenomenon.   Given history would suggest aspirin 81 mg daily.    Migraine.   Can follow up with our office if she wishes to pursue further.     Case discussed with ER team (Dr Chavez attending).

## 2022-11-12 NOTE — ED CDU PROVIDER INITIAL DAY NOTE - ATTENDING APP SHARED VISIT CONTRIBUTION OF CARE
I agree with the PA's note and was available for any issues/concerns. I was directly involved in patient care. My brief overall assessment is as follows:    47 year old female multiple PMHx c/o numbness; initial work up reviewed; admit to obs for MRI

## 2022-11-12 NOTE — ED PROVIDER NOTE - PROGRESS NOTE DETAILS
pt placed in cdu for mri and neurology consult, pt on ocp for endometrisosis, will consider adding aspirin for TIA prevention

## 2022-11-12 NOTE — ED PROVIDER NOTE - OBJECTIVE STATEMENT
48 y/o F with h/o antiphospholipid antibody syndrome, PFO repair and multiple TIAs before p/w left facial numbness since 6 pm last night and has chronic left arm numbness. Pt denies pregnancy. Pt is on ocp and not on any aspirin, plavix or blood thinners.

## 2022-11-12 NOTE — ED PROVIDER NOTE - CRANIAL NERVE AND PUPILLARY EXAM
NIHSS 0/cranial nerves 2-12 intact/cough reflex intact/corneal reflex intact/central and peripheral vision intact/central vision intact/peripheral vision intact/gag reflex intact

## 2022-11-12 NOTE — ED ADULT NURSE NOTE - NEURO BEHAVIOR
Well appearing, well nourished, awake, alert, oriented to person, place, time/situation and in no apparent distress. normal... calm/cooperative

## 2022-11-12 NOTE — CONSULT NOTE ADULT - SUBJECTIVE AND OBJECTIVE BOX
[facility]                                        Neurology at [place]                                  [facility]                                      [address]                                           [place], [place], [address]                                                [phone]        CC: Paresthesias.     HISTORY:  The patient is a 47y Female who had seen Dr Gilberto Dove in our office in  for vertigo.   She also reports a long history of migraine.   She had presented with left face and arm numbness that has subsequently improved.   She had similar symptoms previously and was found to have patent foramen ovale which was repaired.     PAST MEDICAL & SURGICAL HISTORY:  Vertigo  Endometriosis  Migraines  S/P laparoscopic surgery  Endometriosis  S/P  section  x3    MEDICATION PRIOR TO ADMISSION:  Birth control.    Allergies  Bactrim (Rash)    SOCIAL HISTORY:  Non smoker.     FAMILY HISTORY:  Family history of stroke (Grandparent)  Mother stroke   Family history of ovarian cancer (Mother)  Family history of testicular cancer (Father)  Family history of pancreatic cancer (Father)    ROS:  Constitutional: The patient denies fevers or weight changes.  Neuro: As per HPI.  Eyes: Denies blurry vision.  Ears/nose/throat: Denies Tinnitus.   Cardiac: Denies chest pain. Denies palpitations.  Respiratory: Denies shortness of breath.  GI: Denies abdominal pain, nausea, or vomiting.  : Denies change in urinary pattern.  Integumentary: Denies rash.  Psych: Denies recent mood changes.  Heme: denies easy bleeding/bruising.    Exam:  Vital Signs Last 24 Hrs  T(C): 36.9 (2022 11:04), Max: 36.9 (2022 11:04)  T(F): 98.4 (2022 11:04), Max: 98.4 (2022 11:04)  HR: 90 (2022 11:04) (90 - 117)  BP: 148/79 (2022 11:04) (148/79 - 167/103)  RR: 18 (2022 11:04) (18 - 18)  SpO2: 95% (2022 11:04) (95% - 98%)    Parameters below as of 2022 11:04  Patient On (Oxygen Delivery Method): room air    General: NAD.   Carotid bruits absent.     Mental status: The patient is awake, alert, and fully oriented. There is no aphasia. Attention span is normal. Patient is aware of current events.     Cranial nerves: There is no papilledema. Pupils react symmetrically to light. There is no visual field deficit to confrontation. Extraocular motion is full with no nystagmus.  Facial sensation is intact. Facial musculature is symmetric. Palate elevates symmetrically. Tongue is midline.    Motor: There is normal bulk and tone.  Strength is 5/5 in the right arm and leg.   Strength is 5/5 in the left arm and leg.    Sensation: Intact to light touch and pin. There is no extinction to double simultaneous stimulation.    Reflexes: 2+ throughout and plantar responses are flexor.    Cerebellar: There is no dysmetria on finger to nose testing.    [facility] SS:   Date: 22  Time:   1a) Level of consciousness (0-3): 0  1b) Questions (0-2): 0  1c) Commands (0-2): 0  2  ) Gaze (0-2): 0  3  ) Visual field (0-3): 0  4  ) Facial palsy (0-3): 0  Motor  5a) Left arm (0-4): 0  5b) Right arm (0-4): 0  6a) Left leg (0-4): 0  6b) Right leg (0-4): 0  7  ) Ataxia (0-2): 0  Sensory  8  ) Sensory (0-2): 0  Speech  9  ) Language (0-3): 0  10) Dysarthria (0-2): 0  Extinction  11) Extinction/inattention (0-2): 0    Total score: 0    Prestroke Modified [place]: 0    (0: No symptoms and no disability.  1: No significant disability despite symptoms; able to carry out all usual duties and activities.  2: Slight disability; unable to carry out all previous activity but able to look after own affairs without assistance.  3: Moderate disability; requiring some help but able to walk without assistance.   4: Moderately severe disability; unable to walk without assistance and unable to attend to own bodily needs without assistance.  5: Severe disability; bedridden, incontinent and requiring constant nursing care and attention.   6: Dead. )     LABS:                         14.2   5.73  )-----------( 350      ( 2022 08:06 )             40.6       -    139  |  103  |  13.4  ----------------------------<  95  3.7   |  23.0  |  0.81    Ca    9.4      2022 08:06    TPro  7.9  /  Alb  4.6  /  TBili  0.5  /  DBili  x   /  AST  17  /  ALT  12  /  AlkPhos  57  11-12    PT/INR - ( 2022 08:06 )   PT: 11.7 sec;   INR: 1.01 ratio    PTT - ( 2022 08:06 )  PTT:29.8 sec    RADIOLOGY   MRI brain images reviewed (and concur with report): There is no acute pathology.

## 2022-11-12 NOTE — ED CDU PROVIDER DISPOSITION NOTE - PATIENT PORTAL LINK FT
You can access the FollowMyHealth Patient Portal offered by [facility] by registering at the following website: [email or web address]. By joining [facility]’s FollowMyHealth portal, you will also be able to view your health information using other applications (apps) compatible with our system.

## 2022-11-12 NOTE — ED CDU PROVIDER DISPOSITION NOTE - NSFOLLOWUPINSTRUCTIONS_ED_ALL_ED_FT
please follow with neurology   outpatient coagulation work-up   please return to the ED for new or worsening symptoms

## 2022-11-12 NOTE — ED ADULT NURSE NOTE - OBJECTIVE STATEMENT
Assumed care of pt at this time pt lined/lb in triage. Pt on tele monitor reports left sided numbness x last night states it has gotten a little better. denies weakness denies vision changes, neg slurred speech.

## 2022-11-12 NOTE — ED CDU PROVIDER DISPOSITION NOTE - CARE PROVIDER_API CALL
Bertrand Chapa)  Neurology  [address], [address]  [place] [address]  Phone: [phone]  Fax: [phone]  Follow Up Time: Urgent

## 2023-01-03 ENCOUNTER — APPOINTMENT (OUTPATIENT)
Dept: INTERNAL MEDICINE | Facility: CLINIC | Age: 48
End: 2023-01-03
Payer: MEDICAID

## 2023-01-03 VITALS
BODY MASS INDEX: 24.75 KG/M2 | HEIGHT: 64 IN | WEIGHT: 145 LBS | DIASTOLIC BLOOD PRESSURE: 81 MMHG | SYSTOLIC BLOOD PRESSURE: 141 MMHG | HEART RATE: 90 BPM

## 2023-01-03 DIAGNOSIS — Z82.3 FAMILY HISTORY OF STROKE: ICD-10-CM

## 2023-01-03 PROCEDURE — 99396 PREV VISIT EST AGE 40-64: CPT | Mod: 25

## 2023-01-03 PROCEDURE — 36415 COLL VENOUS BLD VENIPUNCTURE: CPT

## 2023-01-03 RX ORDER — ASPIRIN 325 MG/1
325 TABLET ORAL
Refills: 0 | Status: COMPLETED | COMMUNITY
End: 2023-01-03

## 2023-01-03 RX ORDER — NORETHINDRONE ACETATE AND ETHINYL ESTRADIOL AND FERROUS FUMARATE 1MG-20(21)
1-20 KIT ORAL DAILY
Qty: 3 | Refills: 1 | Status: COMPLETED | COMMUNITY
End: 2023-01-03

## 2023-01-03 NOTE — PLAN
[FreeTextEntry1] : In regards to patients Physical exam, routine blood work drawn, will review results with patient. \par \par Vestibular migraine/ HIstory of TIA- patient referred to Neurologist \par \par Counseling included abnormal lab results, differential diagnoses, treatment options, risks and benefits, lifestyle changes, prognosis, current condition, medications, and dose adjustments. \par The patient was interactive, attentive, asked questions, and verbalized understanding

## 2023-01-03 NOTE — HISTORY OF PRESENT ILLNESS
[FreeTextEntry1] : physical exam  [de-identified] : Ms. TANO JULIEN is a 47 year female comes to the office for physical exam. Patient denies fever, cough SOB. No other complaints at this time.

## 2023-01-03 NOTE — HEALTH RISK ASSESSMENT
[Good] : ~his/her~  mood as  good [Never] : Never [Yes] : Yes [Monthly or less (1 pt)] : Monthly or less (1 point) [1 or 2 (0 pts)] : 1 or 2 (0 points) [Never (0 pts)] : Never (0 points) [No] : In the past 12 months have you used drugs other than those required for medical reasons? No [No falls in past year] : Patient reported no falls in the past year [0] : 2) Feeling down, depressed, or hopeless: Not at all (0) [Patient refused screening] : Patient refused screening [PHQ-2 Negative - No further assessment needed] : PHQ-2 Negative - No further assessment needed [Audit-CScore] : 1 [[id number]] : 0 [Patient declined Low Dose CT Scan] : Patient declined Low Dose CT Scan [Patient declined Retinal Exam] : Patient declined Retinal Exam. [Patient reported colonoscopy was normal] : Patient reported colonoscopy was normal [HIV test declined] : HIV test declined [Hepatitis C test declined] : Hepatitis C test declined [MammogramDate] : 07/22 [ColonoscopyDate] : 2015

## 2023-01-04 LAB
25(OH)D3 SERPL-MCNC: 35.2 NG/ML
ALBUMIN SERPL ELPH-MCNC: 4.7 G/DL
ALP BLD-CCNC: 49 U/L
ALT SERPL-CCNC: 14 U/L
ANION GAP SERPL CALC-SCNC: 14 MMOL/L
AST SERPL-CCNC: 20 U/L
BASOPHILS # BLD AUTO: 0.06 K/UL
BASOPHILS NFR BLD AUTO: 0.8 %
BILIRUB SERPL-MCNC: 0.5 MG/DL
BUN SERPL-MCNC: 17 MG/DL
CALCIUM SERPL-MCNC: 9.6 MG/DL
CHLORIDE SERPL-SCNC: 101 MMOL/L
CHOLEST SERPL-MCNC: 218 MG/DL
CO2 SERPL-SCNC: 23 MMOL/L
CREAT SERPL-MCNC: 0.81 MG/DL
EGFR: 91 ML/MIN/1.73M2
EOSINOPHIL # BLD AUTO: 0.16 K/UL
EOSINOPHIL NFR BLD AUTO: 2.2 %
ESTIMATED AVERAGE GLUCOSE: 108 MG/DL
GLUCOSE SERPL-MCNC: 79 MG/DL
HBA1C MFR BLD HPLC: 5.4 %
HCT VFR BLD CALC: 42.2 %
HDLC SERPL-MCNC: 66 MG/DL
HGB BLD-MCNC: 13.4 G/DL
IMM GRANULOCYTES NFR BLD AUTO: 0.1 %
LDLC SERPL CALC-MCNC: 122 MG/DL
LYMPHOCYTES # BLD AUTO: 2.29 K/UL
LYMPHOCYTES NFR BLD AUTO: 31.7 %
MAN DIFF?: NORMAL
MCHC RBC-ENTMCNC: 28.5 PG
MCHC RBC-ENTMCNC: 31.8 GM/DL
MCV RBC AUTO: 89.6 FL
MONOCYTES # BLD AUTO: 0.37 K/UL
MONOCYTES NFR BLD AUTO: 5.1 %
NEUTROPHILS # BLD AUTO: 4.33 K/UL
NEUTROPHILS NFR BLD AUTO: 60.1 %
NONHDLC SERPL-MCNC: 153 MG/DL
PLATELET # BLD AUTO: 310 K/UL
POTASSIUM SERPL-SCNC: 4.6 MMOL/L
PROT SERPL-MCNC: 7.3 G/DL
RBC # BLD: 4.71 M/UL
RBC # FLD: 13.8 %
SODIUM SERPL-SCNC: 138 MMOL/L
TRIGL SERPL-MCNC: 153 MG/DL
TSH SERPL-ACNC: 1.03 UIU/ML
WBC # FLD AUTO: 7.22 K/UL

## 2023-01-05 LAB — CARDIOLIPIN AB SER IA-ACNC: NEGATIVE

## 2023-01-17 ENCOUNTER — OUTPATIENT (OUTPATIENT)
Dept: OUTPATIENT SERVICES | Facility: HOSPITAL | Age: 48
LOS: 1 days | Discharge: HOME | End: 2023-01-17

## 2023-01-17 DIAGNOSIS — Z98.89 OTHER SPECIFIED POSTPROCEDURAL STATES: Chronic | ICD-10-CM

## 2023-01-17 DIAGNOSIS — R79.9 ABNORMAL FINDING OF BLOOD CHEMISTRY, UNSPECIFIED: ICD-10-CM

## 2023-01-23 ENCOUNTER — RESULT REVIEW (OUTPATIENT)
Age: 48
End: 2023-01-23

## 2023-01-23 ENCOUNTER — APPOINTMENT (OUTPATIENT)
Dept: HEMATOLOGY ONCOLOGY | Facility: CLINIC | Age: 48
End: 2023-01-23
Payer: MEDICAID

## 2023-01-23 ENCOUNTER — NON-APPOINTMENT (OUTPATIENT)
Age: 48
End: 2023-01-23

## 2023-01-23 VITALS
BODY MASS INDEX: 25.99 KG/M2 | WEIGHT: 152.25 LBS | DIASTOLIC BLOOD PRESSURE: 88 MMHG | OXYGEN SATURATION: 99 % | HEART RATE: 96 BPM | HEIGHT: 64 IN | SYSTOLIC BLOOD PRESSURE: 149 MMHG

## 2023-01-23 DIAGNOSIS — R79.89 OTHER SPECIFIED ABNORMAL FINDINGS OF BLOOD CHEMISTRY: ICD-10-CM

## 2023-01-23 LAB
BASOPHILS # BLD AUTO: 0.1 K/UL — SIGNIFICANT CHANGE UP (ref 0–0.2)
BASOPHILS NFR BLD AUTO: 1 % — SIGNIFICANT CHANGE UP (ref 0–2)
EOSINOPHIL # BLD AUTO: 0.2 K/UL — SIGNIFICANT CHANGE UP (ref 0–0.5)
EOSINOPHIL NFR BLD AUTO: 2.9 % — SIGNIFICANT CHANGE UP (ref 0–6)
HCT VFR BLD CALC: 41.2 % — SIGNIFICANT CHANGE UP (ref 34.5–45)
HGB BLD-MCNC: 14.2 G/DL — SIGNIFICANT CHANGE UP (ref 11.5–15.5)
LYMPHOCYTES # BLD AUTO: 2.2 K/UL — SIGNIFICANT CHANGE UP (ref 1–3.3)
LYMPHOCYTES # BLD AUTO: 27.7 % — SIGNIFICANT CHANGE UP (ref 13–44)
MCHC RBC-ENTMCNC: 29.2 PG — SIGNIFICANT CHANGE UP (ref 27–34)
MCHC RBC-ENTMCNC: 34.5 G/DL — SIGNIFICANT CHANGE UP (ref 32–36)
MCV RBC AUTO: 84.9 FL — SIGNIFICANT CHANGE UP (ref 80–100)
MONOCYTES # BLD AUTO: 0.5 K/UL — SIGNIFICANT CHANGE UP (ref 0–0.9)
MONOCYTES NFR BLD AUTO: 5.8 % — SIGNIFICANT CHANGE UP (ref 2–14)
NEUTROPHILS # BLD AUTO: 4.9 K/UL — SIGNIFICANT CHANGE UP (ref 1.8–7.4)
NEUTROPHILS NFR BLD AUTO: 62.6 % — SIGNIFICANT CHANGE UP (ref 43–77)
PLATELET # BLD AUTO: 280 K/UL — SIGNIFICANT CHANGE UP (ref 150–400)
RBC # BLD: 4.86 M/UL — SIGNIFICANT CHANGE UP (ref 3.8–5.2)
RBC # FLD: 11.6 % — SIGNIFICANT CHANGE UP (ref 10.3–14.5)
WBC # BLD: 7.9 K/UL — SIGNIFICANT CHANGE UP (ref 3.8–10.5)
WBC # FLD AUTO: 7.9 K/UL — SIGNIFICANT CHANGE UP (ref 3.8–10.5)

## 2023-01-23 PROCEDURE — 99243 OFF/OP CNSLTJ NEW/EST LOW 30: CPT

## 2023-01-23 RX ORDER — ASPIRIN 325 MG/1
325 TABLET ORAL
Refills: 0 | Status: DISCONTINUED | COMMUNITY
End: 2023-01-23

## 2023-01-23 RX ORDER — METRONIDAZOLE 7.5 MG/G
0.75 GEL VAGINAL
Qty: 5 | Refills: 0 | Status: DISCONTINUED | COMMUNITY
Start: 2021-11-17 | End: 2023-01-23

## 2023-01-23 RX ORDER — ASPIRIN 81 MG
81 TABLET, DELAYED RELEASE (ENTERIC COATED) ORAL
Refills: 0 | Status: ACTIVE | COMMUNITY

## 2023-01-23 NOTE — CONSULT LETTER
[Dear  ___] : Dear  [unfilled], [Consult Letter:] : I had the pleasure of evaluating your patient, [unfilled]. [Please see my note below.] : Please see my note below. [Consult Closing:] : Thank you very much for allowing me to participate in the care of this patient.  If you have any questions, please do not hesitate to contact me. [Sincerely,] : Sincerely, [FreeTextEntry2] : Dr. Chou [FreeTextEntry1] : Please find attached our consultation on your patient, Ms. TANO JULIEN . \par We take a multidisciplinary team approach to patient care and consider you, the referring physician, an extension of our team. \par It is our commitment to provide your patient with the highest quality of advanced therapeutic options. We thank you for allowing us to participate in the care of your patient.\par Please do not hesitate to contact our team with any questions or concerns at [phone]..\par \par \par  [FreeTextEntry3] : Jazmin SOTO

## 2023-01-23 NOTE — REASON FOR VISIT
[Initial Consultation] : an initial consultation for [FreeTextEntry2] : abnormal CBC , history of TIA

## 2023-01-23 NOTE — REVIEW OF SYSTEMS
[Palpitations] : palpitations [Negative] : Allergic/Immunologic [FreeTextEntry9] : chronic neck pain, and upper back pain [de-identified] : chronic headaches

## 2023-01-23 NOTE — HISTORY OF PRESENT ILLNESS
[de-identified] : Ms. Stockton is a 47 yo WF referred by Dr. Henson for an abnormal CBC and a history of a TIA , and family history of an Antiphospholipid syndrome.\par  The recent CBC showed a low MCHC, but normal H/H. \par Her history is that she presented with Left sided UE  and facial numbness in 2018, Neuro work up done and she was told she had a TIA. Cardiology work up showed a PFO and this was repaired at that time. She still occasionally has left sided facial numbness. Her mother  recently had a CVA, in [facility]. , and work up revealed that she has an Antiphospholipin syndrome . Patient is on long term non stop oral BCP because of endometriosis.She also suffers from chronic, almost daily migraine headaches.. She has a history of Vertigo.\par  She has no history of blood clots, either DVT or PE. [de-identified] : Still has chronic Migraines with aura and vertigo, Currently taking Lorazepam as needed. recently stated a OTC migraine medication that conatins Magnesium Co enzyme Q and Vitamin B.Still has occasional left sided facial numbness.

## 2023-01-23 NOTE — ASSESSMENT
[FreeTextEntry1] : Ms. Stockton is a 47 yo WF referred by Dr. Henson for an abnormal CBC and a history of a TIA , and family history of an Antiphospholipid syndrome.\par  The recent CBC showed a low MCHC, but normal H/H. \par Her history is that she presented with Left sided UE  and facial numbness in 2018, Neuro work up done and she was told she had a TIA. Cardiology work up showed a PFO and this was repaired at that time. She still occasionally has left sided facial numbness. Her mother  recently had a CVA, in [place] , and work up revealed that she has an Antiphospholipin syndrome . Patient is on long term non stop oral BCP because of endometriosis.She also suffers from chronic, almost daily migraine headaches.. She has a history of Vertigo.\par  She has no history of blood clots, either DVT or PE.\par  Today her CBC is all normal.\par  Because of her history of a TIA, and her family history of an Antiphospholipin syndrome and the fact that she is chronically on Oral BCP, I will send off an APLA profile today. If this is positive, she may need to be re evaluated for oral BCP use .If positive , will repeat these levels in 3 months to confirm positivity.\par  I will call her with results when available.She is to stay on aspirin daily.\par She will be seeing a new neurologist soon.

## 2023-02-06 LAB
B2 GLYCOPROT1 IGA SERPL IA-ACNC: <5 SAU
B2 GLYCOPROT1 IGG SER-ACNC: <5 SGU
B2 GLYCOPROT1 IGM SER-ACNC: <5 SMU
CARDIOLIPIN AB SER IA-ACNC: NEGATIVE
CARDIOLIPIN IGM SER-MCNC: 8.6 MPL
CARDIOLIPIN IGM SER-MCNC: <5 GPL

## 2023-02-07 ENCOUNTER — NON-APPOINTMENT (OUTPATIENT)
Age: 48
End: 2023-02-07

## 2023-02-07 LAB — B2 GLYCOPROT1 AB SER QL: NEGATIVE

## 2023-03-13 ENCOUNTER — APPOINTMENT (OUTPATIENT)
Dept: OBGYN | Facility: CLINIC | Age: 48
End: 2023-03-13
Payer: MEDICAID

## 2023-03-13 VITALS
HEIGHT: 64 IN | BODY MASS INDEX: 25.61 KG/M2 | DIASTOLIC BLOOD PRESSURE: 70 MMHG | SYSTOLIC BLOOD PRESSURE: 130 MMHG | WEIGHT: 150 LBS

## 2023-03-13 DIAGNOSIS — G43.809 OTHER MIGRAINE, NOT INTRACTABLE, W/OUT STATUS MIGRAINOSUS: ICD-10-CM

## 2023-03-13 PROCEDURE — 99213 OFFICE O/P EST LOW 20 MIN: CPT

## 2023-03-13 NOTE — HISTORY OF PRESENT ILLNESS
[N] : Patient reports normal menses [Y] : Positive pregnancy history [Currently Active] : currently active [Men] : men [Vaginal] : vaginal [No] : No [Yes] : Yes [TextBox_4] : PT HERE FOR BC RENEWAL\par LMP: [LMPDate] : N/A [PGHxTotal] : 3 [[facility] : [facility] [[facility]] : 3 [TextBox_6] : N/A [TextBox_9] : 11 [FreeTextEntry1] : N/A

## 2023-04-05 ENCOUNTER — APPOINTMENT (OUTPATIENT)
Dept: NEUROLOGY | Facility: CLINIC | Age: 48
End: 2023-04-05
Payer: MEDICAID

## 2023-04-05 VITALS
SYSTOLIC BLOOD PRESSURE: 153 MMHG | DIASTOLIC BLOOD PRESSURE: 85 MMHG | OXYGEN SATURATION: 98 % | BODY MASS INDEX: 25.61 KG/M2 | WEIGHT: 150 LBS | HEIGHT: 64 IN | HEART RATE: 91 BPM | TEMPERATURE: 97.9 F

## 2023-04-05 PROCEDURE — 99214 OFFICE O/P EST MOD 30 MIN: CPT

## 2023-04-06 NOTE — HISTORY OF PRESENT ILLNESS
[FreeTextEntry1] : Mady Stockton is a 48 year-old woman with PMH migraines, vertigo who presents to the office today for post hospital follow-up. She presented to [facility] on 11/12/23 with left-sided facial numbness that began the night before. CT and CTA were negative. MRI head was normal. Her symptoms gradually resolved. She denies weakness, difficulty speaking or vision changes at this time. She reports history of migraine headaches. Headaches associated with visual aura in the past. She describes headaches as a sharp unilateral head pain with associated light sensitivity and occasional nausea. Severe pain lasts for a few minutes and is followed by a persistent dull ache. She has used a cold cap and OTC NSAIDs in the past with moderate relief. She has no further complaints. GYN discontinued estrogen containing OCP due to concern for thrombotic events. \par \par

## 2023-04-06 NOTE — DISCUSSION/SUMMARY
[FreeTextEntry1] : Mady Stockton is a 48 year-old woman with PMH migraines, vertigo who presents to the office today for post hospital follow-up. Transient paresthesias more likely 2/2 anxiety or migraine than TIA. Plan to lab work to assess for increased risk of thrombotic events on OCP. We discussed treatment options of abortive therapy and preventative therapy and the importance of limiting abortive treatments to no more than 3x/week to prevent medication overuse headaches. QTc 462. Plan to begin TPM 25mg as directed for migraine ppx and rizatrptan 10mg PRN at onset of headaches. She was educated on side effects of medication and instructed to call the office if she develops side effects or medication is ineffective for her symptoms. Follow-up with me in 2-3 months. All of her questions and concerns were addressed.\par \par \par \par

## 2023-04-06 NOTE — REVIEW OF SYSTEMS
[Numbness] : numbness [Tingling] : tingling [Migraine Headache] : migraine headaches [As Noted in HPI] : as noted in HPI [Fever] : no fever [Chills] : no chills [Confused or Disoriented] : no confusion [Memory Lapses or Loss] : no memory loss [Facial Weakness] : no facial weakness [Arm Weakness] : no arm weakness [Hand Weakness] : no hand weakness [Leg Weakness] : no leg weakness [Difficulty Walking] : no difficulty walking [Anxiety] : no anxiety [Depression] : no depression [Eye Pain] : no eye pain [Eyesight Problems] : no eyesight problems [Earache] : no earache [Loss Of Hearing] : no hearing loss

## 2023-06-22 ENCOUNTER — APPOINTMENT (OUTPATIENT)
Dept: NEUROLOGY | Facility: CLINIC | Age: 48
End: 2023-06-22
Payer: MEDICAID

## 2023-06-22 VITALS
HEART RATE: 88 BPM | DIASTOLIC BLOOD PRESSURE: 82 MMHG | OXYGEN SATURATION: 99 % | BODY MASS INDEX: 23.9 KG/M2 | TEMPERATURE: 97.5 F | HEIGHT: 64 IN | SYSTOLIC BLOOD PRESSURE: 124 MMHG | WEIGHT: 140 LBS

## 2023-06-22 PROCEDURE — 99214 OFFICE O/P EST MOD 30 MIN: CPT

## 2023-06-22 NOTE — HISTORY OF PRESENT ILLNESS
[FreeTextEntry1] : INTERIM HISTORY: Doing well. Notes relief of headaches with TPM 25mg QHS. She reports transient paresthesias since beginning medication and these are tolerable. Has not needed to use rizatriptan. \par \par INITIAL OFFICE VISIT 4/5/23: Mady Stockton is a 48 year-old woman with PMH migraines, vertigo who presents to the office today for post hospital follow-up. She presented to [facility] on 11/12/23 with left-sided facial numbness that began the night before. CT and CTA were negative. MRI head was normal. Her symptoms gradually resolved. She denies weakness, difficulty speaking or vision changes at this time. She reports history of migraine headaches. Headaches associated with visual aura in the past. She describes headaches as a sharp unilateral head pain with associated light sensitivity and occasional nausea. Severe pain lasts for a few minutes and is followed by a persistent dull ache. She has used a cold cap and OTC NSAIDs in the past with moderate relief. She has no further complaints. GYN discontinued estrogen containing OCP due to concern for thrombotic events. \par \par

## 2023-06-22 NOTE — DISCUSSION/SUMMARY
[FreeTextEntry1] : Mady Stockton is a 48 year-old woman with PMH migraines, vertigo who presents to the office today for post hospital follow-up. Migraine symptoms well controlled with TPM. Continue TPM 25mg as directed for migraine ppx and rizatrptan 10mg PRN at onset of headaches. She was educated on side effects of medication and instructed to call the office if she develops side effects or medication is ineffective for her symptoms. Follow-up with me in 6 months or sooner should the need arise. All of her questions and concerns were addressed.\par \par \par \par

## 2023-08-15 DIAGNOSIS — F41.8 OTHER SPECIFIED ANXIETY DISORDERS: ICD-10-CM

## 2023-08-15 RX ORDER — ALPRAZOLAM 0.25 MG/1
0.25 TABLET ORAL
Qty: 2 | Refills: 0 | Status: ACTIVE | COMMUNITY
Start: 2023-08-15 | End: 1900-01-01

## 2023-09-13 ENCOUNTER — APPOINTMENT (OUTPATIENT)
Dept: OBGYN | Facility: CLINIC | Age: 48
End: 2023-09-13
Payer: MEDICAID

## 2023-09-13 ENCOUNTER — NON-APPOINTMENT (OUTPATIENT)
Age: 48
End: 2023-09-13

## 2023-09-13 VITALS
HEIGHT: 64 IN | BODY MASS INDEX: 23.9 KG/M2 | SYSTOLIC BLOOD PRESSURE: 146 MMHG | WEIGHT: 140 LBS | DIASTOLIC BLOOD PRESSURE: 86 MMHG

## 2023-09-13 DIAGNOSIS — R39.9 UNSPECIFIED SYMPTOMS AND SIGNS INVOLVING THE GENITOURINARY SYSTEM: ICD-10-CM

## 2023-09-13 DIAGNOSIS — Z11.3 ENCOUNTER FOR SCREENING FOR INFECTIONS WITH A PREDOMINANTLY SEXUAL MODE OF TRANSMISSION: ICD-10-CM

## 2023-09-13 DIAGNOSIS — Z11.51 ENCOUNTER FOR SCREENING FOR HUMAN PAPILLOMAVIRUS (HPV): ICD-10-CM

## 2023-09-13 DIAGNOSIS — Z01.419 ENCOUNTER FOR GYNECOLOGICAL EXAMINATION (GENERAL) (ROUTINE) W/OUT ABNORMAL FINDINGS: ICD-10-CM

## 2023-09-13 LAB
BILIRUB UR QL STRIP: NEGATIVE
CLARITY UR: CLEAR
COLLECTION METHOD: NORMAL
GLUCOSE UR-MCNC: NEGATIVE
HCG UR QL: 0.2 EU/DL
HGB UR QL STRIP.AUTO: NORMAL
KETONES UR-MCNC: NEGATIVE
LEUKOCYTE ESTERASE UR QL STRIP: NEGATIVE
NITRITE UR QL STRIP: NEGATIVE
PH UR STRIP: 5.5
PROT UR STRIP-MCNC: NEGATIVE
SP GR UR STRIP: 1.01

## 2023-09-13 PROCEDURE — 81002 URINALYSIS NONAUTO W/O SCOPE: CPT

## 2023-09-13 PROCEDURE — 99396 PREV VISIT EST AGE 40-64: CPT

## 2023-09-15 LAB
BACTERIA UR CULT: NORMAL
C TRACH RRNA SPEC QL NAA+PROBE: NOT DETECTED
HPV HIGH+LOW RISK DNA PNL CVX: NOT DETECTED
N GONORRHOEA RRNA SPEC QL NAA+PROBE: NOT DETECTED
SOURCE TP AMPLIFICATION: NORMAL

## 2023-09-20 ENCOUNTER — NON-APPOINTMENT (OUTPATIENT)
Age: 48
End: 2023-09-20

## 2023-09-22 ENCOUNTER — NON-APPOINTMENT (OUTPATIENT)
Age: 48
End: 2023-09-22

## 2023-09-28 LAB — CYTOLOGY CVX/VAG DOC THIN PREP: NORMAL

## 2023-10-03 ENCOUNTER — APPOINTMENT (OUTPATIENT)
Dept: OBGYN | Facility: CLINIC | Age: 48
End: 2023-10-03
Payer: COMMERCIAL

## 2023-10-03 VITALS
BODY MASS INDEX: 23.9 KG/M2 | DIASTOLIC BLOOD PRESSURE: 90 MMHG | SYSTOLIC BLOOD PRESSURE: 134 MMHG | WEIGHT: 140 LBS | HEIGHT: 64 IN

## 2023-10-03 DIAGNOSIS — R92.2 INCONCLUSIVE MAMMOGRAM: ICD-10-CM

## 2023-10-03 DIAGNOSIS — R92.30 INCONCLUSIVE MAMMOGRAM: ICD-10-CM

## 2023-10-03 DIAGNOSIS — N76.0 ACUTE VAGINITIS: ICD-10-CM

## 2023-10-03 PROCEDURE — 99213 OFFICE O/P EST LOW 20 MIN: CPT

## 2023-10-06 LAB — BACTERIA UR CULT: NORMAL

## 2023-10-09 DIAGNOSIS — N89.8 OTHER SPECIFIED NONINFLAMMATORY DISORDERS OF VAGINA: ICD-10-CM

## 2023-10-11 RX ORDER — METRONIDAZOLE 7.5 MG/G
0.75 GEL TOPICAL TWICE DAILY
Qty: 1 | Refills: 0 | Status: DISCONTINUED | COMMUNITY
Start: 2023-10-09 | End: 2023-10-11

## 2024-01-17 RX ORDER — TERCONAZOLE 8 MG/G
0.8 CREAM VAGINAL
Qty: 1 | Refills: 0 | Status: COMPLETED | COMMUNITY
Start: 2023-10-03 | End: 2023-10-06

## 2024-01-17 RX ORDER — FLUCONAZOLE 150 MG/1
150 TABLET ORAL
Qty: 2 | Refills: 0 | Status: COMPLETED | COMMUNITY
Start: 2023-09-13 | End: 2023-09-14

## 2024-01-17 RX ORDER — NITROFURANTOIN (MONOHYDRATE/MACROCRYSTALS) 25; 75 MG/1; MG/1
100 CAPSULE ORAL
Qty: 10 | Refills: 0 | Status: COMPLETED | COMMUNITY
Start: 2023-09-13 | End: 2023-09-18

## 2024-01-17 RX ORDER — METRONIDAZOLE 7.5 MG/G
0.75 GEL VAGINAL
Qty: 1 | Refills: 0 | Status: COMPLETED | COMMUNITY
Start: 2023-10-11 | End: 2023-10-18

## 2024-01-31 ENCOUNTER — TRANSCRIPTION ENCOUNTER (OUTPATIENT)
Age: 49
End: 2024-01-31

## 2024-01-31 ENCOUNTER — APPOINTMENT (OUTPATIENT)
Dept: CARDIOLOGY | Facility: CLINIC | Age: 49
End: 2024-01-31
Payer: COMMERCIAL

## 2024-01-31 ENCOUNTER — NON-APPOINTMENT (OUTPATIENT)
Age: 49
End: 2024-01-31

## 2024-01-31 VITALS
OXYGEN SATURATION: 98 % | SYSTOLIC BLOOD PRESSURE: 142 MMHG | RESPIRATION RATE: 16 BRPM | BODY MASS INDEX: 25.1 KG/M2 | HEART RATE: 98 BPM | WEIGHT: 147 LBS | HEIGHT: 64 IN | DIASTOLIC BLOOD PRESSURE: 90 MMHG

## 2024-01-31 DIAGNOSIS — G45.9 TRANSIENT CEREBRAL ISCHEMIC ATTACK, UNSPECIFIED: ICD-10-CM

## 2024-01-31 DIAGNOSIS — R00.2 PALPITATIONS: ICD-10-CM

## 2024-01-31 DIAGNOSIS — R06.00 DYSPNEA, UNSPECIFIED: ICD-10-CM

## 2024-01-31 PROCEDURE — 93000 ELECTROCARDIOGRAM COMPLETE: CPT

## 2024-01-31 PROCEDURE — 99204 OFFICE O/P NEW MOD 45 MIN: CPT

## 2024-01-31 RX ORDER — RIZATRIPTAN BENZOATE 10 MG/1
10 TABLET ORAL
Qty: 16 | Refills: 1 | Status: DISCONTINUED | COMMUNITY
Start: 2023-04-05 | End: 2024-01-31

## 2024-01-31 RX ORDER — LORAZEPAM 1 MG/1
1 TABLET ORAL
Qty: 30 | Refills: 0 | Status: DISCONTINUED | COMMUNITY
Start: 2020-07-16 | End: 2024-01-31

## 2024-01-31 RX ORDER — ELECTROLYTES/DEXTROSE
SOLUTION, ORAL ORAL
Refills: 0 | Status: DISCONTINUED | COMMUNITY
End: 2024-01-31

## 2024-01-31 NOTE — HISTORY OF PRESENT ILLNESS
[FreeTextEntry1] : 49F with hx of TIA, PFO closure on 2018 who comes to establish cardiovascular care.  Patient reports that for the past 2 weeks she has experienced episodes of intermittent palpitations that last for few seconds, associated with chest pain.  Her symptoms are not related with exertion and improved on their own. She denies intake of caffeine and energy drinks.   In addition to the above symptoms she has noted increasing shortness of breath on exertion since the beginning of the winter, her dyspnea is more evident when climbing stairs and carrying big boxes.   Patient denies syncope.

## 2024-01-31 NOTE — ASSESSMENT
[FreeTextEntry1] : 49F with hx of TIA, PFO closure on 2018 who comes to establish cardiovascular care.  Patient reports that for the past 2 weeks she has experienced episodes of intermittent palpitations that last for few seconds, associated with chest pain.  Her symptoms are not related with exertion and improved on their own. She denies intake of caffeine and energy drinks.   In addition to the above symptoms she has noted increasing shortness of breath on exertion since the beginning of the winter, her dyspnea is more evident when climbing stairs and carrying big boxes.   Patient denies syncope.   #KNUTSON, Chest pain palpitations  will proceed with Exercise stress test  echocardiogram  MCOT X1 week  RTC post testing  I appreciate the opportunity of working with you in the care of TANO MADSEN . If I may be of additional assistance, please do not hesitate to contact me.

## 2024-02-06 ENCOUNTER — APPOINTMENT (OUTPATIENT)
Dept: OBGYN | Facility: CLINIC | Age: 49
End: 2024-02-06
Payer: COMMERCIAL

## 2024-02-06 VITALS
BODY MASS INDEX: 25.1 KG/M2 | WEIGHT: 147 LBS | HEIGHT: 64 IN | SYSTOLIC BLOOD PRESSURE: 133 MMHG | DIASTOLIC BLOOD PRESSURE: 82 MMHG

## 2024-02-06 DIAGNOSIS — Z30.41 ENCOUNTER FOR SURVEILLANCE OF CONTRACEPTIVE PILLS: ICD-10-CM

## 2024-02-06 PROCEDURE — 99213 OFFICE O/P EST LOW 20 MIN: CPT

## 2024-02-06 NOTE — PLAN
[FreeTextEntry1] : Patient doing well on current regimen we will renew Junel 1/20 FE dispense #84 refill x 2 risk benefits pros cons discussed questions answered in layman's terms she will return in 6 months for annual examination and continue follow-up with cardiology.

## 2024-02-06 NOTE — PHYSICAL EXAM
[Chaperone Present] : A chaperone was present in the examining room during all aspects of the physical examination [FreeTextEntry1] : AB [Examination Of The Breasts] : a normal appearance [Breast Palpation Diffuse Fibrous Tissue Bilateral] : fibrocystic changes [Normal] : normal [No Masses] : no breast masses were palpable

## 2024-02-06 NOTE — HISTORY OF PRESENT ILLNESS
[TextBox_4] : PT HERE FOR BC RENEWAL  PT IS ON JUNEL FE 1/20  LMP: 1/14/24 [LMPDate] : 1/14/24 [TextBox_6] : 1/14/24 [FreeTextEntry1] : 1/14/24

## 2024-02-16 RX ORDER — NORETHINDRONE ACETATE AND ETHINYL ESTRADIOL AND FERROUS FUMARATE 1MG-20(21)
1-20 KIT ORAL DAILY
Qty: 4 | Refills: 1 | Status: ACTIVE | COMMUNITY
Start: 2023-04-14 | End: 1900-01-01

## 2024-02-29 NOTE — ED ADULT NURSE NOTE - BREATHING, MLM
Subjective:     Patient ID: Sugar Diaz is a 62 y.o. female    Chief Complaint: salmonella    HPI: 62F with endometrial CA on chemo who was recently admitted for hypokalemia and ongoing diarrhea (12+ BM per day) since August. 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 Had colonoscopy without significant abnormality, path negative for significant abnormality.  GI pathogen panel returned positive for salmonella. Patient was started on cipro w/ significant improvement in diarrhea. States BM are normalizing. No abd pain or fevers. Feeling much better.       Immunization History   Administered Date(s) Administered    COVID-19, vector-nr, rS-Ad26, PF (Speedy) 03/10/2021    Influenza 10/12/2022    Influenza - Quadrivalent - PF *Preferred* (6 months and older) 10/19/2021        Review of Systems   All other systems reviewed and are negative.       Past Medical History:   Diagnosis Date    Chest pain 2002    NEGATIVE STRESS ECHO 2002    Endometrial cancer     2023    GERD (gastroesophageal reflux disease)     Lower back pain     Migraines     Mild allergic rhinitis     Postmenopausal bleeding      Past Surgical History:   Procedure Laterality Date    COLONOSCOPY N/A 10/06/2023    Procedure: COLONOSCOPY;  Surgeon: Marcia Weaver MD;  Location: [facility];  Service: Endoscopy;  Laterality: N/A;  10/4- referred by Dr. Weaver/ Subject: Needs colonoscopy ASAP within 1 week /Procedure Timing: < 1 week/Diagnosis: Diarrhea, evaluate for immunotherapy induced colitis/ Prep instructions to portal. AS    COLONOSCOPY N/A 2/21/2024    Procedure: COLONOSCOPY;  Surgeon: Fercho Ramirez MD;  Location: [facility] ([address] [place] [address]);  Service: Endoscopy;  Laterality: N/A;    CYST REMOVAL Left 04/28/2022    Procedure: EXCISION, CYST-BACK;  Surgeon: Thomas Hurst MD;  Location: [facility] OR;  Service: General;  Laterality: Left;  left upper back  RN PREOP ON 4/21/22-NF    ESOPHAGOGASTRODUODENOSCOPY N/A 2/21/2024    Procedure: EGD  (ESOPHAGOGASTRODUODENOSCOPY);  Surgeon: Fercho Ramirez MD;  Location: [facility] ([address] [place] [address]);  Service: Endoscopy;  Laterality: N/A;    HYSTERECTOMY      after endometrial cancer diagnosis    HYSTEROSCOPY WITH DILATION AND CURETTAGE OF UTERUS N/A 05/23/2023    Procedure: HYSTEROSCOPY, WITH DILATION AND CURETTAGE OF UTERUS;  Surgeon: Omaira Evans MD;  Location: [facility];  Service: OB/GYN;  Laterality: N/A;  NetworkerZEENAT HERNANDEZ  [phone] TEXTED HAMMAD ON 5/2/2023 @ 3:51PM. HAMMAD RESPONDED ON 5/2/2023 @ 3:51PM-LO  RN PREOP 5/18/23  T & S; UPT ORDERED AND SCHEDULED 5/22/23    LYMPH NODE BIOPSY Left 06/05/2023    Procedure: BIOPSY, sentinel LYMPH NODE;  Surgeon: Lavell Rodríguez MD;  Location: [facility];  Service: OB/GYN;  Laterality: Left;    LYMPHADENECTOMY, PELVIS Right 06/05/2023    Procedure: LYMPHADENECTOMY, PELVIS;  Surgeon: Lavell Rodríguez MD;  Location: [facility];  Service: OB/GYN;  Laterality: Right;    MAPPING, LYMPH NODE, SENTINEL Bilateral 06/05/2023    Procedure: injection, LYMPH NODE, SENTINEL;  Surgeon: Lavell Rodríguez MD;  Location: [facility];  Service: OB/GYN;  Laterality: Bilateral;    NERVE REPAIR Right 06/05/2023    Procedure: REPAIR, OBTURATOR NERVE;  Surgeon: Lavell Rodríguez MD;  Location: [facility];  Service: OB/GYN;  Laterality: Right;    TONSILLECTOMY      TONSILLECTOMY      TUBAL LIGATION      XI ROBOTIC HYSTERECTOMY, WITH SALPINGO-OOPHORECTOMY Bilateral 06/05/2023    Procedure: XI ROBOTIC HYSTERECTOMY,WITH SALPINGO-OOPHORECTOMY;  Surgeon: Lavell Rodríguez MD;  Location: [facility];  Service: OB/GYN;  Laterality: Bilateral;  removed through vagina     Family History   Problem Relation Age of Onset    No Known Problems Mother     Heart disease Father     Breast cancer Sister 55    No Known Problems Sister     Colon cancer Paternal Uncle     Esophageal cancer Neg Hx      Social History     Tobacco Use    Smoking status: Never    Smokeless tobacco: Never   Substance Use Topics    Alcohol use: Never    Drug  use: Never       Objective:     Physical Exam  Constitutional:       General: She is not in acute distress.     Appearance: Normal appearance. She is well-developed. She is not ill-appearing or diaphoretic.   HENT:      Head: Normocephalic and atraumatic.      Right Ear: External ear normal.      Left Ear: External ear normal.      Nose: Nose normal.   Eyes:      General: No scleral icterus.        Right eye: No discharge.         Left eye: No discharge.      Extraocular Movements: Extraocular movements intact.      Conjunctiva/sclera: Conjunctivae normal.   Pulmonary:      Effort: Pulmonary effort is normal. No respiratory distress.      Breath sounds: No stridor.   Musculoskeletal:         General: Normal range of motion.   Skin:     Findings: No erythema or rash.   Neurological:      General: No focal deficit present.      Mental Status: She is alert and oriented to person, place, and time. Mental status is at baseline.      Cranial Nerves: No cranial nerve deficit.   Psychiatric:         Mood and Affect: Mood normal.         Behavior: Behavior normal.         Thought Content: Thought content normal.         Judgment: Judgment normal.         Data:    All data, including recent labs, radiology, and pathology, has been independently reviewed.    Labs:    Recent Labs   Lab Result Units 01/03/24  0800 02/15/24  1459 02/15/24  1937 02/16/24  0406 02/16/24  1505 02/16/24  1735 02/20/24  0512 02/21/24  0228 02/22/24  0245   WBC K/uL  --   --  7.37  --   --   --   --   --   --    Hemoglobin g/dL  --   --  15.5  --   --   --   --   --   --    Hematocrit %  --   --  42.9  --   --   --   --   --   --    Sodium mmol/L 139 136 137   < >  --    < > 141 139 137   Potassium mmol/L 3.3* 2.7* 2.5*   < >  --    < > 3.1* 2.8* 3.7   Chloride mmol/L 106 97 97   < >  --    < > 109 108 110   BUN mg/dL 7 8 7   < >  --    < > 11 9 12   Creatinine mg/dL 0.8 0.7 0.7   < >  --    < > 0.6 0.6 0.6   AST U/L 20 25 28  --   --   --   --   --    --    ALT U/L 18 22 24  --   --   --   --   --   --    Alkaline Phosphatase U/L 76 81 85  --   --   --   --   --   --    Total Bilirubin mg/dL 0.4 0.3 0.4  --   --   --   --   --   --    CRP mg/L  --   --   --   --  3.0  --   --   --   --    HIV 1/2 Ag/Ab   --   --  Non-reactive  --   --   --   --   --   --     < > = values in this interval not displayed.        Radiology:    No results found in the last 24 hours.     Assessment:    1. Salmonella  Complete course of cipro   Patient to call if symptoms recur after finishing antibiotics            Follow up as needed    The total time for evaluation and management services performed on 2/29/24 was greater than 15 minutes.     Paige Huston DO  Transplant Infectious Disease     Spontaneous, unlabored and symmetrical

## 2024-03-08 ENCOUNTER — NON-APPOINTMENT (OUTPATIENT)
Age: 49
End: 2024-03-08

## 2024-03-08 ENCOUNTER — APPOINTMENT (OUTPATIENT)
Dept: INTERNAL MEDICINE | Facility: CLINIC | Age: 49
End: 2024-03-08
Payer: COMMERCIAL

## 2024-03-08 VITALS
SYSTOLIC BLOOD PRESSURE: 138 MMHG | RESPIRATION RATE: 16 BRPM | HEART RATE: 92 BPM | BODY MASS INDEX: 25.1 KG/M2 | DIASTOLIC BLOOD PRESSURE: 90 MMHG | WEIGHT: 147 LBS | HEIGHT: 64 IN

## 2024-03-08 DIAGNOSIS — F41.1 GENERALIZED ANXIETY DISORDER: ICD-10-CM

## 2024-03-08 DIAGNOSIS — R79.9 ABNORMAL FINDING OF BLOOD CHEMISTRY, UNSPECIFIED: ICD-10-CM

## 2024-03-08 DIAGNOSIS — Z00.00 ENCOUNTER FOR GENERAL ADULT MEDICAL EXAMINATION W/OUT ABNORMAL FINDINGS: ICD-10-CM

## 2024-03-08 DIAGNOSIS — R53.83 OTHER FATIGUE: ICD-10-CM

## 2024-03-08 DIAGNOSIS — E55.9 VITAMIN D DEFICIENCY, UNSPECIFIED: ICD-10-CM

## 2024-03-08 PROCEDURE — 36415 COLL VENOUS BLD VENIPUNCTURE: CPT

## 2024-03-08 PROCEDURE — 99396 PREV VISIT EST AGE 40-64: CPT

## 2024-03-08 RX ORDER — BUSPIRONE HYDROCHLORIDE 10 MG/1
10 TABLET ORAL TWICE DAILY
Qty: 180 | Refills: 3 | Status: ACTIVE | COMMUNITY
Start: 2024-03-08 | End: 1900-01-01

## 2024-03-08 NOTE — PLAN
[FreeTextEntry1] : In regards to patients Physical exam, routine blood work drawn, will review results with patient.   Vestibular migraine/ History of TIA- patient will follow with neurologist  Neurologist   RODO- in joint decision making with patient, it was decided that patient will take Buspirone 10 mg PO BID  Varicose veins- patient referred to vascular surgeon.   Counseling included abnormal lab results, differential diagnoses, treatment options, risks and benefits, lifestyle changes, prognosis, current condition, medications, and dose adjustments.  The patient was interactive, attentive, asked questions, and verbalized understanding

## 2024-03-08 NOTE — HISTORY OF PRESENT ILLNESS
[FreeTextEntry1] : physical exam  [de-identified] : Ms. TANO JULIEN is a 49 year female comes to the office for physical exam. Patient denies fever, cough SOB. No other complaints at this time.

## 2024-03-08 NOTE — PHYSICAL EXAM
[No Acute Distress] : no acute distress [Well Nourished] : well nourished [Well Developed] : well developed [Normal Sclera/Conjunctiva] : normal sclera/conjunctiva [Well-Appearing] : well-appearing [PERRL] : pupils equal round and reactive to light [EOMI] : extraocular movements intact [Normal Oropharynx] : the oropharynx was normal [Normal Outer Ear/Nose] : the outer ears and nose were normal in appearance [No JVD] : no jugular venous distention [No Lymphadenopathy] : no lymphadenopathy [Supple] : supple [No Respiratory Distress] : no respiratory distress  [No Accessory Muscle Use] : no accessory muscle use [Normal Rate] : normal rate  [Clear to Auscultation] : lungs were clear to auscultation bilaterally [Regular Rhythm] : with a regular rhythm [Normal S1, S2] : normal S1 and S2 [No Carotid Bruits] : no carotid bruits [No Abdominal Bruit] : a ~M bruit was not heard ~T in the abdomen [No Varicosities] : no varicosities [No Edema] : there was no peripheral edema [No Palpable Aorta] : no palpable aorta [No Extremity Clubbing/Cyanosis] : no extremity clubbing/cyanosis [Soft] : abdomen soft [Non Tender] : non-tender [Non-distended] : non-distended [Normal Bowel Sounds] : normal bowel sounds [No HSM] : no HSM [Normal Posterior Cervical Nodes] : no posterior cervical lymphadenopathy [Normal Anterior Cervical Nodes] : no anterior cervical lymphadenopathy [No Joint Swelling] : no joint swelling [No CVA Tenderness] : no CVA  tenderness [No Spinal Tenderness] : no spinal tenderness [Grossly Normal Strength/Tone] : grossly normal strength/tone [No Rash] : no rash [Coordination Grossly Intact] : coordination grossly intact [No Focal Deficits] : no focal deficits [Normal Gait] : normal gait [Normal Insight/Judgement] : insight and judgment were intact [Normal Affect] : the affect was normal

## 2024-03-08 NOTE — HEALTH RISK ASSESSMENT
[Good] : ~his/her~  mood as  good [0] : 1) Little interest or pleasure doing things: Not at all (0) [PHQ-2 Negative - No further assessment needed] : PHQ-2 Negative - No further assessment needed [HIV test declined] : HIV test declined [Patient reported mammogram was normal] : Patient reported mammogram was normal [Hepatitis C test declined] : Hepatitis C test declined [Never] : Never [[id number]] : 0 [MammogramDate] : 2023

## 2024-03-11 LAB
25(OH)D3 SERPL-MCNC: 34.5 NG/ML
ALBUMIN SERPL ELPH-MCNC: 4.6 G/DL
ALP BLD-CCNC: 48 U/L
ALT SERPL-CCNC: 12 U/L
ANION GAP SERPL CALC-SCNC: 12 MMOL/L
AST SERPL-CCNC: 15 U/L
BASOPHILS # BLD AUTO: 0.1 K/UL
BASOPHILS NFR BLD AUTO: 1.4 %
BILIRUB SERPL-MCNC: 0.5 MG/DL
BUN SERPL-MCNC: 20 MG/DL
CALCIUM SERPL-MCNC: 9.6 MG/DL
CHLORIDE SERPL-SCNC: 103 MMOL/L
CHOLEST SERPL-MCNC: 219 MG/DL
CO2 SERPL-SCNC: 22 MMOL/L
CREAT SERPL-MCNC: 0.94 MG/DL
EGFR: 75 ML/MIN/1.73M2
EOSINOPHIL # BLD AUTO: 0.19 K/UL
EOSINOPHIL NFR BLD AUTO: 2.7 %
ESTIMATED AVERAGE GLUCOSE: 108 MG/DL
GLUCOSE SERPL-MCNC: 86 MG/DL
HBA1C MFR BLD HPLC: 5.4 %
HCT VFR BLD CALC: 41.3 %
HDLC SERPL-MCNC: 65 MG/DL
HGB BLD-MCNC: 13.5 G/DL
IMM GRANULOCYTES NFR BLD AUTO: 0.3 %
LDLC SERPL CALC-MCNC: 136 MG/DL
LYMPHOCYTES # BLD AUTO: 2.56 K/UL
LYMPHOCYTES NFR BLD AUTO: 36.5 %
MAGNESIUM SERPL-MCNC: 2.1 MG/DL
MAN DIFF?: NORMAL
MCHC RBC-ENTMCNC: 28.7 PG
MCHC RBC-ENTMCNC: 32.7 GM/DL
MCV RBC AUTO: 87.9 FL
MONOCYTES # BLD AUTO: 0.45 K/UL
MONOCYTES NFR BLD AUTO: 6.4 %
NEUTROPHILS # BLD AUTO: 3.69 K/UL
NEUTROPHILS NFR BLD AUTO: 52.7 %
NONHDLC SERPL-MCNC: 154 MG/DL
PLATELET # BLD AUTO: 300 K/UL
POTASSIUM SERPL-SCNC: 4.1 MMOL/L
PROT SERPL-MCNC: 7.5 G/DL
RBC # BLD: 4.7 M/UL
RBC # FLD: 13.4 %
SODIUM SERPL-SCNC: 137 MMOL/L
TRIGL SERPL-MCNC: 100 MG/DL
TSH SERPL-ACNC: 1.7 UIU/ML
WBC # FLD AUTO: 7.01 K/UL

## 2024-03-12 ENCOUNTER — APPOINTMENT (OUTPATIENT)
Dept: CARDIOLOGY | Facility: CLINIC | Age: 49
End: 2024-03-12
Payer: COMMERCIAL

## 2024-03-12 PROCEDURE — 93306 TTE W/DOPPLER COMPLETE: CPT

## 2024-03-12 PROCEDURE — 93015 CV STRESS TEST SUPVJ I&R: CPT

## 2024-03-14 ENCOUNTER — APPOINTMENT (OUTPATIENT)
Dept: VASCULAR SURGERY | Facility: CLINIC | Age: 49
End: 2024-03-14
Payer: COMMERCIAL

## 2024-03-14 VITALS
HEIGHT: 63.5 IN | TEMPERATURE: 97.2 F | RESPIRATION RATE: 14 BRPM | SYSTOLIC BLOOD PRESSURE: 151 MMHG | DIASTOLIC BLOOD PRESSURE: 91 MMHG | WEIGHT: 149 LBS | BODY MASS INDEX: 26.08 KG/M2 | OXYGEN SATURATION: 99 % | HEART RATE: 93 BPM

## 2024-03-14 DIAGNOSIS — I83.93 ASYMPTOMATIC VARICOSE VEINS OF BILATERAL LOWER EXTREMITIES: ICD-10-CM

## 2024-03-14 PROCEDURE — 99202 OFFICE O/P NEW SF 15 MIN: CPT

## 2024-03-17 PROBLEM — I83.93 VARICOSE VEINS OF LEGS: Status: ACTIVE | Noted: 2024-03-08

## 2024-03-17 NOTE — ASSESSMENT
[FreeTextEntry1] : 50 YO F with C1 venous insufficiency - compression stockings provided to prevent progression of vein disease - return to clinic in 6 months

## 2024-03-17 NOTE — PHYSICAL EXAM
The right DP pulse was 1+.  The right radial pulse was +2.  [Normal Rate and Rhythm] : normal rate and rhythm [Respiratory Effort] : normal respiratory effort [Ankle Swelling (On Exam)] : not present [] : not present [Abdomen Tenderness] : ~T ~M No abdominal tenderness [No Rash or Lesion] : No rash or lesion [Alert] : alert [Oriented to Person] : oriented to person [Oriented to Place] : oriented to place [Oriented to Time] : oriented to time [Calm] : calm [de-identified] : no acute distress noted, [de-identified] : normocephalic, [FreeTextEntry1] : palpable pedal pulses, venous telangiectasias to popliteal fossa and inner thighs [de-identified] : moves all extremities

## 2024-03-17 NOTE — HISTORY OF PRESENT ILLNESS
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 She denies DVT history. Does not wear compression stockings. Denies leg pain, heaviness, swelling. Is on her feet for long periods of time

## 2024-03-28 ENCOUNTER — APPOINTMENT (OUTPATIENT)
Dept: CARDIOLOGY | Facility: CLINIC | Age: 49
End: 2024-03-28
Payer: COMMERCIAL

## 2024-03-28 VITALS
RESPIRATION RATE: 16 BRPM | SYSTOLIC BLOOD PRESSURE: 160 MMHG | HEIGHT: 63.5 IN | HEART RATE: 86 BPM | DIASTOLIC BLOOD PRESSURE: 90 MMHG | BODY MASS INDEX: 25.9 KG/M2 | OXYGEN SATURATION: 98 % | WEIGHT: 148 LBS

## 2024-03-28 DIAGNOSIS — R07.89 OTHER CHEST PAIN: ICD-10-CM

## 2024-03-28 DIAGNOSIS — Z87.74 PERSONAL HISTORY OF (CORRECTED) CONGENITAL MALFORMATIONS OF HEART AND CIRCULATORY SYSTEM: ICD-10-CM

## 2024-03-28 PROCEDURE — 99214 OFFICE O/P EST MOD 30 MIN: CPT

## 2024-03-28 NOTE — ASSESSMENT
[FreeTextEntry1] : 49F with hx of TIA, PFO closure on 2018 who comes to establish cardiovascular care.  Patient reports that for the past 2 weeks she has experienced episodes of intermittent palpitations that last for few seconds, associated with chest pain.  Reports improvement of palpitations, On last visit patient was ordered TTE. EST and MCOT, patient is here to review results.  Patient denies syncope.   #KNUTSON, Chest pain palpitations/PVCs  unremarkable Exercise stress test  and echocardiogram  MCOT 2/6/24- 2/12/24: No AFib, No SVT, no heart blocks, No Pauses, no VT, no PAC, PVC burden <2%  #Elevated BP without diagnosis of HTN Patient instructed to monitor BP at home and bring log to f/u.   RTC 1 months.

## 2024-03-28 NOTE — CARDIOLOGY SUMMARY
[de-identified] : 1/31/24: NSR, LAE, poor R wave progression.  [de-identified] : MCOT 2/6/24- 2/12/24: No AFib, No SVT, no heart blocks, No Pauses, no VT, no PAC, PVC burden <2% [de-identified] : TTE 3/12/24: 1. Left ventricular systolic function is normal with an ejection fraction visually estimated at 60 to 65 %. 2. Normal left ventricular diastolic function. 3. Normal right ventricular cavity size, with normal wall thickness, and normal systolic function. 4. Normal left and right atrial size. 5. No significant valvular disease. 6. Status post prior surgical repair of the interatrial septum. The interatrial device appears well seated. There is no evidence of pulmonary venous obstruction by the interatrial device. No residual interatrial shunting seen. [de-identified] : EST 3/17/24: 1. The ECG is negative for ischemia. Baseline artifact limits interpretation of ST segment changes. 2. Arrhythmias: There is occasional premature ventricular contractions and rare PACs noted during stress. 3. The patient underwent stress testing using the standard Anthony protocol. _ The patient exercised for 9 min 0 sec. _ The test was stopped due to shortness of breath. _ The peak heart rate was 171 bpm; 99 % of predicted maximal heart rate for this patient. _ The patient achieved 10 METS which is consistent with good exercise capacity considering age and other clinical characteristics.

## 2024-03-28 NOTE — HISTORY OF PRESENT ILLNESS
[FreeTextEntry1] : 49F with hx of TIA, PFO closure on 2018 who comes to establish cardiovascular care.  Patient reports that for the past 2 weeks she has experienced episodes of intermittent palpitations that last for few seconds, associated with chest pain.  Reports improvement of palpitations, On last visit patient was ordered TTE. EST and MCOT, patient is here to review results.  Patient denies syncope.

## 2024-04-09 ENCOUNTER — APPOINTMENT (OUTPATIENT)
Dept: GASTROENTEROLOGY | Facility: CLINIC | Age: 49
End: 2024-04-09
Payer: COMMERCIAL

## 2024-04-09 VITALS
SYSTOLIC BLOOD PRESSURE: 193 MMHG | BODY MASS INDEX: 25.9 KG/M2 | HEIGHT: 63.5 IN | RESPIRATION RATE: 14 BRPM | DIASTOLIC BLOOD PRESSURE: 91 MMHG | HEART RATE: 109 BPM | WEIGHT: 148 LBS | OXYGEN SATURATION: 98 % | TEMPERATURE: 97.7 F

## 2024-04-09 VITALS — SYSTOLIC BLOOD PRESSURE: 150 MMHG | DIASTOLIC BLOOD PRESSURE: 86 MMHG

## 2024-04-09 DIAGNOSIS — Z12.11 ENCOUNTER FOR SCREENING FOR MALIGNANT NEOPLASM OF COLON: ICD-10-CM

## 2024-04-09 DIAGNOSIS — R06.02 SHORTNESS OF BREATH: ICD-10-CM

## 2024-04-09 DIAGNOSIS — Z83.2 FAMILY HISTORY OF DISEASES OF THE BLOOD AND BLOOD-FORMING ORGANS AND CERTAIN DISORDERS INVOLVING THE IMMUNE MECHANISM: ICD-10-CM

## 2024-04-09 DIAGNOSIS — R00.2 PALPITATIONS: ICD-10-CM

## 2024-04-09 PROCEDURE — 99203 OFFICE O/P NEW LOW 30 MIN: CPT

## 2024-04-09 RX ORDER — SODIUM SULFATE, POTASSIUM SULFATE AND MAGNESIUM SULFATE 1.6; 3.13; 17.5 G/177ML; G/177ML; G/177ML
17.5-3.13-1.6 SOLUTION ORAL
Qty: 1 | Refills: 0 | Status: ACTIVE | COMMUNITY
Start: 2024-04-09 | End: 1900-01-01

## 2024-04-09 RX ORDER — LORATADINE 5 MG
TABLET,CHEWABLE ORAL
Refills: 0 | Status: ACTIVE | COMMUNITY

## 2024-04-09 NOTE — ASSESSMENT
[FreeTextEntry1] : 49 year old female of average risk for colorectal neoplasm presenting for colon cancer screening. She will be scheduled for a colonoscopy with Suprep for further evaluation. I have discussed the indications (including but not limited to ruling out inflammatory bowel disease, colorectal neoplasm, GI bleed, and AVM's), benefits, risks  (including but not limited to reaction to the anesthesia, infection, bleeding, missed lesions, and perforation),  and alternatives to colonoscopy with the patient. The patient understands all options and has agreed to have a colonoscopy and is medically optimized for the planned procedure.

## 2024-04-09 NOTE — PHYSICAL EXAM

## 2024-04-09 NOTE — HISTORY OF PRESENT ILLNESS
[FreeTextEntry1] : TANO MADSEN is a 49 year old female with PMH PFO closure in 2018, TIA (from PFO), C/S, migraines. presenting today for colon cancer screening. Last colonoscopy was over 10 years ago when she was having bowel issues and was normal. Family history is negative for colon cancer and colon polyps. She is chronically constipated and moves her bowels every 2-4 days. She recently started using MIralax with adequate relief. Denies black or bloody stools. No abdominal pain.  S/P negative cardiac work up, and recent labs are unremarkable.

## 2024-05-09 ENCOUNTER — NON-APPOINTMENT (OUTPATIENT)
Age: 49
End: 2024-05-09

## 2024-05-09 ENCOUNTER — APPOINTMENT (OUTPATIENT)
Dept: CARDIOLOGY | Facility: CLINIC | Age: 49
End: 2024-05-09
Payer: COMMERCIAL

## 2024-05-09 VITALS
SYSTOLIC BLOOD PRESSURE: 160 MMHG | RESPIRATION RATE: 14 BRPM | BODY MASS INDEX: 26.05 KG/M2 | HEIGHT: 63 IN | WEIGHT: 147 LBS | DIASTOLIC BLOOD PRESSURE: 90 MMHG | HEART RATE: 97 BPM

## 2024-05-09 DIAGNOSIS — R94.31 ABNORMAL ELECTROCARDIOGRAM [ECG] [EKG]: ICD-10-CM

## 2024-05-09 DIAGNOSIS — R03.0 ELEVATED BLOOD-PRESSURE READING, W/OUT DIAGNOSIS OF HYPERTENSION: ICD-10-CM

## 2024-05-09 DIAGNOSIS — I49.3 VENTRICULAR PREMATURE DEPOLARIZATION: ICD-10-CM

## 2024-05-09 PROCEDURE — 93000 ELECTROCARDIOGRAM COMPLETE: CPT

## 2024-05-09 PROCEDURE — 99214 OFFICE O/P EST MOD 30 MIN: CPT

## 2024-05-09 PROCEDURE — G2211 COMPLEX E/M VISIT ADD ON: CPT

## 2024-05-09 NOTE — CARDIOLOGY SUMMARY
[de-identified] : 1/31/24: NSR, LAE, poor R wave progression.  5/9/24: NSR, poor R wave progression.  [de-identified] : MCOT 2/6/24- 2/12/24: No AFib, No SVT, no heart blocks, No Pauses, no VT, no PAC, PVC burden <2% [de-identified] : EST 3/17/24: 1. The ECG is negative for ischemia. Baseline artifact limits interpretation of ST segment changes. 2. Arrhythmias: There is occasional premature ventricular contractions and rare PACs noted during stress. 3. The patient underwent stress testing using the standard Anthony protocol. _ The patient exercised for 9 min 0 sec. _ The test was stopped due to shortness of breath. _ The peak heart rate was 171 bpm; 99 % of predicted maximal heart rate for this patient. _ The patient achieved 10 METS which is consistent with good exercise capacity considering age and other clinical characteristics. [de-identified] : TTE 3/12/24: 1. Left ventricular systolic function is normal with an ejection fraction visually estimated at 60 to 65 %. 2. Normal left ventricular diastolic function. 3. Normal right ventricular cavity size, with normal wall thickness, and normal systolic function. 4. Normal left and right atrial size. 5. No significant valvular disease. 6. Status post prior surgical repair of the interatrial septum. The interatrial device appears well seated. There is no evidence of pulmonary venous obstruction by the interatrial device. No residual interatrial shunting seen.

## 2024-05-09 NOTE — ASSESSMENT
[FreeTextEntry1] : 49F with hx of TIA, PFO closure on 2018, PVCs,  comes to office to follow up on elevated BP noted on prior visits.   Patient denies chest pain, no palpitations, no KNUTSON, no PND, no orthopnea, no leg edema,  no claudication, no syncope.  #palpitations/PVCs MCOT 2/6/24- 2/12/24: No AFib, No SVT, no heart blocks, No Pauses, no VT, no PAC, PVC burden <2%  #Elevated BP without diagnosis of HTN bp logs at home WNL elevated BP in the office, findings are consistent with white coat syndrome.    RTC 3 months

## 2024-05-09 NOTE — HISTORY OF PRESENT ILLNESS
[FreeTextEntry1] : 49F with hx of TIA, PFO closure on 2018, PVCs,  comes to office to follow up on elevated BP noted on prior visits.   Patient denies chest pain, no palpitations, no KNUTSON, no PND, no orthopnea, no leg edema,  no claudication, no syncope.

## 2024-05-16 ENCOUNTER — APPOINTMENT (OUTPATIENT)
Dept: NEUROLOGY | Facility: CLINIC | Age: 49
End: 2024-05-16
Payer: COMMERCIAL

## 2024-05-16 VITALS
BODY MASS INDEX: 25.1 KG/M2 | DIASTOLIC BLOOD PRESSURE: 85 MMHG | SYSTOLIC BLOOD PRESSURE: 155 MMHG | OXYGEN SATURATION: 99 % | HEART RATE: 92 BPM | HEIGHT: 64 IN | WEIGHT: 147 LBS

## 2024-05-16 DIAGNOSIS — G43.109 MIGRAINE WITH AURA, NOT INTRACTABLE, W/OUT STATUS MIGRAINOSUS: ICD-10-CM

## 2024-05-16 PROCEDURE — G2211 COMPLEX E/M VISIT ADD ON: CPT

## 2024-05-16 PROCEDURE — 99213 OFFICE O/P EST LOW 20 MIN: CPT

## 2024-05-16 RX ORDER — TOPIRAMATE 25 MG/1
25 TABLET, FILM COATED ORAL
Qty: 90 | Refills: 2 | Status: ACTIVE | COMMUNITY
Start: 2023-04-05 | End: 1900-01-01

## 2024-05-16 NOTE — HISTORY OF PRESENT ILLNESS
[FreeTextEntry1] : INTERIM HISTORY: Since her last appointment, patient's symptoms have been stable. Headaches well controlled on TPM. She is waiting to close on a new house and dealing with some anxiety. She was started on buspirone for this. She notes mild, occasional tingling in heels.  She denies any other new or concerning neurological symptoms or medication side effects.   INITIAL OFFICE VISIT 4/5/23: Mady Stockton is a 48 year-old woman with PMH migraines, vertigo who presents to the office today for post hospital follow-up. She presented to [facility] on [facility]/12/23 with left-sided facial numbness that began the night before. CT and CTA were negative. MRI head was normal. Her symptoms gradually resolved. She denies weakness, difficulty speaking or vision changes at this time. She reports history of migraine headaches. Headaches associated with visual aura in the past. She describes headaches as a sharp unilateral head pain with associated light sensitivity and occasional nausea. Severe pain lasts for a few minutes and is followed by a persistent dull ache. She has used a cold cap and OTC NSAIDs in the past with moderate relief. She has no further complaints. GYN discontinued estrogen containing OCP due to concern for thrombotic events.   6/22/23: Doing well. Notes relief of headaches with TPM 25mg QHS. She reports transient paresthesias since beginning medication and these are tolerable. Has not needed to use rizatriptan.

## 2024-05-16 NOTE — PHYSICAL EXAM
[FreeTextEntry1] : GENERAL PHYSICAL EXAM:\par  GEN: no distress, normal affect\par  EYES: sclera white, conjunctiva clear, no nystagmus\par  CV: normal rhythm\par  PULM: no respiratory distress, normal rhythm and effort\par  EXT: no edema, no cyanosis\par  MSK: muscle tone and strength normal\par  SKIN: warm, dry, no rash or lesion on exposed skin \par  \par  NEUROLOGICAL EXAM:\par  Mental Status\par  Orientation: alert and oriented to person, place, time, and situation\par  Language: clear and fluent, intact comprehension and repetition\par  \par  Cranial Nerves\par  II: visual fields full to confrontation \par  III, IV, VI: PERRL, EOMI\par  V, VII: facial sensation and movement intact and symmetric \par  VIII: hearing intact \par  IX, X: uvula midline, soft palate elevates normally \par  XI: BL shoulder shrug intact \par  XII: tongue midline\par  \par  Motor\par  Shoulder abd: 5 (R), 5 (L)\par  EF/EE: 5 (R), 5 (L)\par  WF/WE: 5 (R), 5 (L)\par  hand : 5 (R), 5 (L)\par  HF/HE: 5 (R), 5 (L)\par  KF/KE: 5 (R), 5 (L)\par  DF/PF: 5 (R), 5 (L) \par  Tone and bulk are normal in upper and lower limbs\par  No pronator drift\par  \par  Sensation\par  Intact to light touch in all 4 EXTs\par  \par  Reflex\par  2+ in BL biceps, brachioradialis, patella\par  \par  Coordination\par  Normal FTN bilaterally\par  Able to perform rapid, alternating movements\par  \par  Gait\par  Normal stance, stride, and pivot turn\par  Tandem walk intact\par  Negative Romberg

## 2024-05-16 NOTE — DISCUSSION/SUMMARY
[FreeTextEntry1] : Mady Stockton is a 49 year-old woman with PMH migraines, vertigo who presents to the office today for migraine follow-up. Migraine symptoms well controlled with TPM. Continue TPM 25mg as directed for migraine ppx. She was educated on side effects of medication and instructed to call the office if she develops side effects or medication is ineffective for her symptoms. Follow-up with me in 6 months or sooner should the need arise. All of her questions and concerns were addressed.

## 2024-06-04 ENCOUNTER — TRANSCRIPTION ENCOUNTER (OUTPATIENT)
Age: 49
End: 2024-06-04

## 2024-06-05 ENCOUNTER — APPOINTMENT (OUTPATIENT)
Dept: GASTROENTEROLOGY | Facility: GI CENTER | Age: 49
End: 2024-06-05
Payer: COMMERCIAL

## 2024-06-05 ENCOUNTER — OUTPATIENT (OUTPATIENT)
Dept: OUTPATIENT SERVICES | Facility: HOSPITAL | Age: 49
LOS: 1 days | End: 2024-06-05
Payer: COMMERCIAL

## 2024-06-05 ENCOUNTER — RESULT REVIEW (OUTPATIENT)
Age: 49
End: 2024-06-05

## 2024-06-05 DIAGNOSIS — Z98.89 OTHER SPECIFIED POSTPROCEDURAL STATES: Chronic | ICD-10-CM

## 2024-06-05 DIAGNOSIS — Z12.11 ENCOUNTER FOR SCREENING FOR MALIGNANT NEOPLASM OF COLON: ICD-10-CM

## 2024-06-05 DIAGNOSIS — D12.6 BENIGN NEOPLASM OF COLON, UNSPECIFIED: ICD-10-CM

## 2024-06-05 PROCEDURE — 88305 TISSUE EXAM BY PATHOLOGIST: CPT | Mod: 26

## 2024-06-05 PROCEDURE — 45380 COLONOSCOPY AND BIOPSY: CPT | Mod: 33

## 2024-06-05 PROCEDURE — 88305 TISSUE EXAM BY PATHOLOGIST: CPT

## 2024-06-05 PROCEDURE — 45380 COLONOSCOPY AND BIOPSY: CPT | Mod: PT

## 2024-06-05 NOTE — ASSESSMENT
[FreeTextEntry1] : A/P screening colon   I discussed the risks and benefits of colonoscopy and patient was given opportunity to ask questions. Colonoscopy to r/o colon cancer, polyps, AVM's. Patient is medically optimized for the procedure

## 2024-06-10 LAB — SURGICAL PATHOLOGY STUDY: SIGNIFICANT CHANGE UP

## 2024-06-20 ENCOUNTER — NON-APPOINTMENT (OUTPATIENT)
Age: 49
End: 2024-06-20

## 2024-08-15 ENCOUNTER — APPOINTMENT (OUTPATIENT)
Dept: CARDIOLOGY | Facility: CLINIC | Age: 49
End: 2024-08-15
Payer: COMMERCIAL

## 2024-08-15 ENCOUNTER — NON-APPOINTMENT (OUTPATIENT)
Age: 49
End: 2024-08-15

## 2024-08-15 VITALS
RESPIRATION RATE: 15 BRPM | BODY MASS INDEX: 23.9 KG/M2 | HEIGHT: 64 IN | SYSTOLIC BLOOD PRESSURE: 144 MMHG | DIASTOLIC BLOOD PRESSURE: 90 MMHG | WEIGHT: 140 LBS | OXYGEN SATURATION: 98 % | HEART RATE: 86 BPM

## 2024-08-15 DIAGNOSIS — R03.0 ELEVATED BLOOD-PRESSURE READING, W/OUT DIAGNOSIS OF HYPERTENSION: ICD-10-CM

## 2024-08-15 DIAGNOSIS — I49.3 VENTRICULAR PREMATURE DEPOLARIZATION: ICD-10-CM

## 2024-08-15 PROCEDURE — 93000 ELECTROCARDIOGRAM COMPLETE: CPT

## 2024-08-15 PROCEDURE — G2211 COMPLEX E/M VISIT ADD ON: CPT

## 2024-08-15 PROCEDURE — 99213 OFFICE O/P EST LOW 20 MIN: CPT

## 2024-08-15 NOTE — ASSESSMENT
[FreeTextEntry1] : 49F with hx of TIA, PFO closure on 2018, PVCs,  comes to office to follow up on elevated BP noted on prior visits.  Going through the stress of moving and fixing her new home.  Patient denies chest pain, no palpitations, no KNUTSON, no PND, no orthopnea, no leg edema,  no claudication, no syncope.  #palpitations/PVCs MCOT 2/6/24- 2/12/24: No AFib, No SVT, no heart blocks, No Pauses, no VT, no PAC, PVC burden <2%  #Elevated BP without diagnosis of HTN bp logs at home WNL elevated BP in the office, findings are consistent with white coat syndrome.  ambulatory BP monitor one month after moving to her new residence.    RTC 2 months

## 2024-08-15 NOTE — HISTORY OF PRESENT ILLNESS
[FreeTextEntry1] : 49F with hx of TIA, PFO closure on 2018, [place],  comes to office to follow up on elevated BP noted on prior visits.  Going through the stress of moving and fixing her new home.  Patient denies chest pain, no palpitations, no KNUTSON, no PND, no orthopnea, no leg edema,  no claudication, no syncope.

## 2024-08-15 NOTE — CARDIOLOGY SUMMARY
[de-identified] : 1/31/24: NSR, LAE, poor R wave progression.  5/9/24: NSR, poor R wave progression.  8/15/24: NSR, poor R wave progression.  [de-identified] : MCOT 2/6/24- 2/12/24: No AFib, No SVT, no heart blocks, No Pauses, no VT, no PAC, PVC burden <2% [de-identified] : EST 3/17/24: 1. The ECG is negative for ischemia. Baseline artifact limits interpretation of ST segment changes. 2. Arrhythmias: There is occasional premature ventricular contractions and rare PACs noted during stress. 3. The patient underwent stress testing using the standard Anthony protocol. _ The patient exercised for 9 min 0 sec. _ The test was stopped due to shortness of breath. _ The peak heart rate was 171 bpm; 99 % of predicted maximal heart rate for this patient. _ The patient achieved 10 METS which is consistent with good exercise capacity considering age and other clinical characteristics. [de-identified] : TTE 3/12/24: 1. Left ventricular systolic function is normal with an ejection fraction visually estimated at 60 to 65 %. 2. Normal left ventricular diastolic function. 3. Normal right ventricular cavity size, with normal wall thickness, and normal systolic function. 4. Normal left and right atrial size. 5. No significant valvular disease. 6. Status post prior surgical repair of the interatrial septum. The interatrial device appears well seated. There is no evidence of pulmonary venous obstruction by the interatrial device. No residual interatrial shunting seen.

## 2024-09-09 ENCOUNTER — APPOINTMENT (OUTPATIENT)
Dept: OBGYN | Facility: CLINIC | Age: 49
End: 2024-09-09
Payer: COMMERCIAL

## 2024-09-09 ENCOUNTER — NON-APPOINTMENT (OUTPATIENT)
Age: 49
End: 2024-09-09

## 2024-09-09 VITALS
SYSTOLIC BLOOD PRESSURE: 153 MMHG | HEIGHT: 64 IN | DIASTOLIC BLOOD PRESSURE: 92 MMHG | BODY MASS INDEX: 23.9 KG/M2 | HEART RATE: 96 BPM | WEIGHT: 140 LBS

## 2024-09-09 DIAGNOSIS — Z12.39 ENCOUNTER FOR OTHER SCREENING FOR MALIGNANT NEOPLASM OF BREAST: ICD-10-CM

## 2024-09-09 DIAGNOSIS — Z01.419 ENCOUNTER FOR GYNECOLOGICAL EXAMINATION (GENERAL) (ROUTINE) W/OUT ABNORMAL FINDINGS: ICD-10-CM

## 2024-09-09 DIAGNOSIS — N76.0 ACUTE VAGINITIS: ICD-10-CM

## 2024-09-09 PROCEDURE — 99396 PREV VISIT EST AGE 40-64: CPT

## 2024-09-09 RX ORDER — FLUCONAZOLE 150 MG/1
150 TABLET ORAL
Qty: 1 | Refills: 0 | Status: ACTIVE | COMMUNITY
Start: 2024-09-09 | End: 1900-01-01

## 2024-09-09 NOTE — PLAN
[FreeTextEntry1] : Pap smear completed patient will have mammogram bilateral breast sonogram, she will continue with Junel 1/20 as this has resolved her pelvic pain and and has no headaches or other symptoms related to use of this birth control pill.  Patient will continue with vitamin D3 multivitamin she will return in 6 months

## 2024-09-09 NOTE — PHYSICAL EXAM
[Chaperone Present] : A chaperone was present in the examining room during all aspects of the physical examination [Appropriately responsive] : appropriately responsive [Alert] : alert [No Acute Distress] : no acute distress [No Lymphadenopathy] : no lymphadenopathy [Regular Rate Rhythm] : regular rate rhythm [No Murmurs] : no murmurs [Clear to Auscultation B/L] : clear to auscultation bilaterally [Soft] : soft [Non-tender] : non-tender [Non-distended] : non-distended [No HSM] : No HSM [No Lesions] : no lesions [No Mass] : no mass [Oriented x3] : oriented x3 [Examination Of The Breasts] : a normal appearance [Breast Palpation Diffuse Fibrous Tissue Bilateral] : fibrocystic changes [] : implants [No Masses] : no breast masses were palpable [Labia Majora] : normal [Labia Minora] : normal [Normal] : normal [Uterine Adnexae] : normal [Declined] : Patient declined rectal exam [FreeTextEntry2] : KF [FreeTextEntry6] : Symmetrical, no masses nontender implants in site [FreeTextEntry8] : No masses nontender

## 2024-09-09 NOTE — HISTORY OF PRESENT ILLNESS
[HIV test declined] : HIV test declined [Syphilis test declined] : Syphilis test declined [Gonorrhea test declined] : Gonorrhea test declined [Chlamydia test declined] : Chlamydia test declined [Trichomonas test declined] : Trichomonas test declined [HPV test declined] : HPV test declined [Hepatitis B test declined] : Hepatitis B test declined [Hepatitis C test declined] : Hepatitis C test declined [N] : Patient reports normal menses [Y] : Positive pregnancy history [Currently Active] : currently active [Men] : men [Vaginal] : vaginal [No] : No [LMP unknown] : LMP unknown [unknown] : Patient is unsure of the date of her LMP [TextBox_4] : PT HERE FOR ANNUAL EXAM LMP: DOES NOT MENTRUATE ON BIRTH CONTROL  PT IS ON JUNEL FE 1/20 [Mammogramdate] : 9/20/23 [TextBox_19] : BR1 [BreastSonogramDate] : 9/20/23 [TextBox_25] : BR2 [PapSmeardate] : 9/13/23 [TextBox_31] : WNL [ColonoscopyDate] : 6/5/24 [TextBox_43] : POLYPS [PGHxTotal] : 3 [[facility]Term] : 3 [[facility] Hospitaliving] : 3 [FreeTextEntry1] : C SEC X3 [TextBox_13] : 28 [TextBox_15] : 5

## 2024-09-10 ENCOUNTER — APPOINTMENT (OUTPATIENT)
Dept: VASCULAR SURGERY | Facility: CLINIC | Age: 49
End: 2024-09-10

## 2024-09-13 LAB — CYTOLOGY CVX/VAG DOC THIN PREP: NORMAL

## 2024-09-18 ENCOUNTER — APPOINTMENT (OUTPATIENT)
Dept: CARDIOLOGY | Facility: CLINIC | Age: 49
End: 2024-09-18

## 2024-09-18 PROCEDURE — 93784 AMBL BP MNTR W/SOFTWARE: CPT

## 2024-09-24 ENCOUNTER — NON-APPOINTMENT (OUTPATIENT)
Age: 49
End: 2024-09-24

## 2024-10-08 ENCOUNTER — APPOINTMENT (OUTPATIENT)
Dept: MATERNAL FETAL MEDICINE | Facility: CLINIC | Age: 49
End: 2024-10-08
Payer: COMMERCIAL

## 2024-10-08 ENCOUNTER — ASOB RESULT (OUTPATIENT)
Age: 49
End: 2024-10-08

## 2024-10-08 PROCEDURE — 99202 OFFICE O/P NEW SF 15 MIN: CPT | Mod: 95

## 2024-10-10 ENCOUNTER — APPOINTMENT (OUTPATIENT)
Dept: ANTEPARTUM | Facility: CLINIC | Age: 49
End: 2024-10-10
Payer: COMMERCIAL

## 2024-10-10 PROCEDURE — 36415 COLL VENOUS BLD VENIPUNCTURE: CPT

## 2024-10-17 ENCOUNTER — APPOINTMENT (OUTPATIENT)
Dept: CARDIOLOGY | Facility: CLINIC | Age: 49
End: 2024-10-17
Payer: COMMERCIAL

## 2024-10-17 ENCOUNTER — NON-APPOINTMENT (OUTPATIENT)
Age: 49
End: 2024-10-17

## 2024-10-17 VITALS
WEIGHT: 143 LBS | SYSTOLIC BLOOD PRESSURE: 144 MMHG | HEIGHT: 64 IN | HEART RATE: 87 BPM | OXYGEN SATURATION: 98 % | BODY MASS INDEX: 24.41 KG/M2 | DIASTOLIC BLOOD PRESSURE: 90 MMHG | RESPIRATION RATE: 16 BRPM

## 2024-10-17 DIAGNOSIS — I10 ESSENTIAL (PRIMARY) HYPERTENSION: ICD-10-CM

## 2024-10-17 DIAGNOSIS — R94.31 ABNORMAL ELECTROCARDIOGRAM [ECG] [EKG]: ICD-10-CM

## 2024-10-17 PROCEDURE — 99213 OFFICE O/P EST LOW 20 MIN: CPT

## 2024-10-17 PROCEDURE — G2211 COMPLEX E/M VISIT ADD ON: CPT

## 2024-10-17 PROCEDURE — 93000 ELECTROCARDIOGRAM COMPLETE: CPT

## 2024-10-17 RX ORDER — AMLODIPINE BESYLATE 5 MG/1
5 TABLET ORAL DAILY
Qty: 90 | Refills: 1 | Status: ACTIVE | COMMUNITY
Start: 2024-10-17 | End: 1900-01-01

## 2024-11-06 ENCOUNTER — APPOINTMENT (OUTPATIENT)
Dept: MATERNAL FETAL MEDICINE | Facility: CLINIC | Age: 49
End: 2024-11-06

## 2024-11-07 ENCOUNTER — APPOINTMENT (OUTPATIENT)
Dept: MATERNAL FETAL MEDICINE | Facility: CLINIC | Age: 49
End: 2024-11-07
Payer: COMMERCIAL

## 2024-11-07 ENCOUNTER — ASOB RESULT (OUTPATIENT)
Age: 49
End: 2024-11-07

## 2024-11-07 PROCEDURE — 99212 OFFICE O/P EST SF 10 MIN: CPT | Mod: 95

## 2024-11-15 DIAGNOSIS — Z12.39 ENCOUNTER FOR OTHER SCREENING FOR MALIGNANT NEOPLASM OF BREAST: ICD-10-CM

## 2024-11-26 ENCOUNTER — APPOINTMENT (OUTPATIENT)
Dept: NEUROLOGY | Facility: CLINIC | Age: 49
End: 2024-11-26
Payer: COMMERCIAL

## 2024-11-26 VITALS
HEART RATE: 86 BPM | SYSTOLIC BLOOD PRESSURE: 146 MMHG | HEIGHT: 64 IN | OXYGEN SATURATION: 98 % | BODY MASS INDEX: 23.9 KG/M2 | WEIGHT: 140 LBS | DIASTOLIC BLOOD PRESSURE: 84 MMHG

## 2024-11-26 DIAGNOSIS — G43.809 OTHER MIGRAINE, NOT INTRACTABLE, W/OUT STATUS MIGRAINOSUS: ICD-10-CM

## 2024-11-26 DIAGNOSIS — G43.109 MIGRAINE WITH AURA, NOT INTRACTABLE, W/OUT STATUS MIGRAINOSUS: ICD-10-CM

## 2024-11-26 PROCEDURE — 99213 OFFICE O/P EST LOW 20 MIN: CPT

## 2025-01-06 ENCOUNTER — RX RENEWAL (OUTPATIENT)
Age: 50
End: 2025-01-06

## 2025-01-20 ENCOUNTER — NON-APPOINTMENT (OUTPATIENT)
Age: 50
End: 2025-01-20

## 2025-02-06 ENCOUNTER — APPOINTMENT (OUTPATIENT)
Dept: CARDIOLOGY | Facility: CLINIC | Age: 50
End: 2025-02-06
Payer: COMMERCIAL

## 2025-02-06 ENCOUNTER — NON-APPOINTMENT (OUTPATIENT)
Age: 50
End: 2025-02-06

## 2025-02-06 VITALS
HEIGHT: 64 IN | WEIGHT: 142 LBS | BODY MASS INDEX: 24.24 KG/M2 | HEART RATE: 88 BPM | DIASTOLIC BLOOD PRESSURE: 90 MMHG | SYSTOLIC BLOOD PRESSURE: 164 MMHG

## 2025-02-06 DIAGNOSIS — R00.2 PALPITATIONS: ICD-10-CM

## 2025-02-06 DIAGNOSIS — R79.89 OTHER SPECIFIED ABNORMAL FINDINGS OF BLOOD CHEMISTRY: ICD-10-CM

## 2025-02-06 DIAGNOSIS — I49.3 VENTRICULAR PREMATURE DEPOLARIZATION: ICD-10-CM

## 2025-02-06 DIAGNOSIS — R03.0 ELEVATED BLOOD-PRESSURE READING, W/OUT DIAGNOSIS OF HYPERTENSION: ICD-10-CM

## 2025-02-06 PROCEDURE — 99214 OFFICE O/P EST MOD 30 MIN: CPT

## 2025-02-06 PROCEDURE — 93000 ELECTROCARDIOGRAM COMPLETE: CPT

## 2025-02-21 ENCOUNTER — APPOINTMENT (OUTPATIENT)
Dept: OBGYN | Facility: CLINIC | Age: 50
End: 2025-02-21
Payer: COMMERCIAL

## 2025-02-21 VITALS
SYSTOLIC BLOOD PRESSURE: 160 MMHG | DIASTOLIC BLOOD PRESSURE: 92 MMHG | WEIGHT: 140 LBS | HEIGHT: 64 IN | BODY MASS INDEX: 23.9 KG/M2 | HEART RATE: 111 BPM

## 2025-02-21 DIAGNOSIS — Z30.41 ENCOUNTER FOR SURVEILLANCE OF CONTRACEPTIVE PILLS: ICD-10-CM

## 2025-02-21 DIAGNOSIS — F41.1 GENERALIZED ANXIETY DISORDER: ICD-10-CM

## 2025-02-21 PROCEDURE — 99213 OFFICE O/P EST LOW 20 MIN: CPT

## 2025-03-11 ENCOUNTER — APPOINTMENT (OUTPATIENT)
Dept: INTERNAL MEDICINE | Facility: CLINIC | Age: 50
End: 2025-03-11
Payer: COMMERCIAL

## 2025-03-11 VITALS
HEIGHT: 64 IN | DIASTOLIC BLOOD PRESSURE: 89 MMHG | HEART RATE: 98 BPM | SYSTOLIC BLOOD PRESSURE: 166 MMHG | BODY MASS INDEX: 23.9 KG/M2 | WEIGHT: 140 LBS

## 2025-03-11 DIAGNOSIS — M54.50 LOW BACK PAIN, UNSPECIFIED: ICD-10-CM

## 2025-03-11 DIAGNOSIS — G43.809 OTHER MIGRAINE, NOT INTRACTABLE, W/OUT STATUS MIGRAINOSUS: ICD-10-CM

## 2025-03-11 DIAGNOSIS — I10 ESSENTIAL (PRIMARY) HYPERTENSION: ICD-10-CM

## 2025-03-11 DIAGNOSIS — F41.1 GENERALIZED ANXIETY DISORDER: ICD-10-CM

## 2025-03-11 PROCEDURE — 36415 COLL VENOUS BLD VENIPUNCTURE: CPT

## 2025-03-11 PROCEDURE — G2211 COMPLEX E/M VISIT ADD ON: CPT

## 2025-03-11 PROCEDURE — 99214 OFFICE O/P EST MOD 30 MIN: CPT

## 2025-03-11 RX ORDER — TIZANIDINE 4 MG/1
4 TABLET ORAL
Qty: 90 | Refills: 1 | Status: ACTIVE | COMMUNITY
Start: 2025-03-11 | End: 1900-01-01

## 2025-04-22 ENCOUNTER — NON-APPOINTMENT (OUTPATIENT)
Age: 50
End: 2025-04-22

## 2025-04-24 ENCOUNTER — APPOINTMENT (OUTPATIENT)
Dept: INTERNAL MEDICINE | Facility: CLINIC | Age: 50
End: 2025-04-24
Payer: COMMERCIAL

## 2025-04-24 VITALS
SYSTOLIC BLOOD PRESSURE: 156 MMHG | HEIGHT: 64 IN | WEIGHT: 150 LBS | DIASTOLIC BLOOD PRESSURE: 88 MMHG | HEART RATE: 96 BPM | OXYGEN SATURATION: 98 % | BODY MASS INDEX: 25.61 KG/M2

## 2025-04-24 DIAGNOSIS — Z12.83 ENCOUNTER FOR SCREENING FOR MALIGNANT NEOPLASM OF SKIN: ICD-10-CM

## 2025-04-24 DIAGNOSIS — R53.83 OTHER FATIGUE: ICD-10-CM

## 2025-04-24 DIAGNOSIS — R10.811 RIGHT UPPER QUADRANT ABDOMINAL TENDERNESS: ICD-10-CM

## 2025-04-24 DIAGNOSIS — R39.9 UNSPECIFIED SYMPTOMS AND SIGNS INVOLVING THE GENITOURINARY SYSTEM: ICD-10-CM

## 2025-04-24 DIAGNOSIS — Z00.00 ENCOUNTER FOR GENERAL ADULT MEDICAL EXAMINATION W/OUT ABNORMAL FINDINGS: ICD-10-CM

## 2025-04-24 DIAGNOSIS — R73.01 IMPAIRED FASTING GLUCOSE: ICD-10-CM

## 2025-04-24 DIAGNOSIS — I10 ESSENTIAL (PRIMARY) HYPERTENSION: ICD-10-CM

## 2025-04-24 DIAGNOSIS — F41.1 GENERALIZED ANXIETY DISORDER: ICD-10-CM

## 2025-04-24 DIAGNOSIS — R79.9 ABNORMAL FINDING OF BLOOD CHEMISTRY, UNSPECIFIED: ICD-10-CM

## 2025-04-24 PROCEDURE — 99396 PREV VISIT EST AGE 40-64: CPT

## 2025-04-24 PROCEDURE — 36415 COLL VENOUS BLD VENIPUNCTURE: CPT

## 2025-04-24 RX ORDER — NITROFURANTOIN (MONOHYDRATE/MACROCRYSTALS) 25; 75 MG/1; MG/1
100 CAPSULE ORAL
Qty: 10 | Refills: 0 | Status: ACTIVE | COMMUNITY
Start: 2025-04-24 | End: 1900-01-01

## 2025-04-25 LAB
ALBUMIN SERPL ELPH-MCNC: 4.4 G/DL
ALP BLD-CCNC: 55 U/L
ALT SERPL-CCNC: 11 U/L
ANION GAP SERPL CALC-SCNC: 18 MMOL/L
APPEARANCE: CLEAR
AST SERPL-CCNC: 14 U/L
BACTERIA: NEGATIVE /HPF
BASOPHILS # BLD AUTO: 0.08 K/UL
BASOPHILS NFR BLD AUTO: 0.9 %
BILIRUB SERPL-MCNC: 0.3 MG/DL
BILIRUBIN URINE: NEGATIVE
BLOOD URINE: NEGATIVE
BUN SERPL-MCNC: 20 MG/DL
CALCIUM SERPL-MCNC: 10 MG/DL
CAST: 0 /LPF
CHLORIDE SERPL-SCNC: 101 MMOL/L
CHOLEST SERPL-MCNC: 238 MG/DL
CO2 SERPL-SCNC: 20 MMOL/L
COLOR: YELLOW
CREAT SERPL-MCNC: 0.85 MG/DL
EGFRCR SERPLBLD CKD-EPI 2021: 84 ML/MIN/1.73M2
EOSINOPHIL # BLD AUTO: 0.38 K/UL
EOSINOPHIL NFR BLD AUTO: 4.2 %
EPITHELIAL CELLS: 2 /HPF
ESTIMATED AVERAGE GLUCOSE: 108 MG/DL
GLUCOSE QUALITATIVE U: NEGATIVE MG/DL
GLUCOSE SERPL-MCNC: 92 MG/DL
HBA1C MFR BLD HPLC: 5.4 %
HCT VFR BLD CALC: 39.5 %
HDLC SERPL-MCNC: 65 MG/DL
HGB BLD-MCNC: 13.2 G/DL
IMM GRANULOCYTES NFR BLD AUTO: 0.2 %
KETONES URINE: NEGATIVE MG/DL
LDLC SERPL-MCNC: 150 MG/DL
LEUKOCYTE ESTERASE URINE: ABNORMAL
LYMPHOCYTES # BLD AUTO: 3.08 K/UL
LYMPHOCYTES NFR BLD AUTO: 33.8 %
MAN DIFF?: NORMAL
MCHC RBC-ENTMCNC: 28.8 PG
MCHC RBC-ENTMCNC: 33.4 G/DL
MCV RBC AUTO: 86.1 FL
MICROSCOPIC-UA: NORMAL
MONOCYTES # BLD AUTO: 0.67 K/UL
MONOCYTES NFR BLD AUTO: 7.4 %
NEUTROPHILS # BLD AUTO: 4.87 K/UL
NEUTROPHILS NFR BLD AUTO: 53.5 %
NITRITE URINE: NEGATIVE
NONHDLC SERPL-MCNC: 173 MG/DL
PH URINE: 6
PLATELET # BLD AUTO: 315 K/UL
POTASSIUM SERPL-SCNC: 4.3 MMOL/L
PROT SERPL-MCNC: 7.2 G/DL
PROTEIN URINE: NEGATIVE MG/DL
RBC # BLD: 4.59 M/UL
RBC # FLD: 13.9 %
RED BLOOD CELLS URINE: 0 /HPF
SODIUM SERPL-SCNC: 139 MMOL/L
SPECIFIC GRAVITY URINE: 1.01
TRIGL SERPL-MCNC: 129 MG/DL
TSH SERPL-ACNC: 1.68 UIU/ML
UROBILINOGEN URINE: 0.2 MG/DL
WBC # FLD AUTO: 9.1 K/UL
WHITE BLOOD CELLS URINE: 4 /HPF

## 2025-04-28 LAB — BACTERIA UR CULT: NORMAL

## 2025-08-22 ENCOUNTER — APPOINTMENT (OUTPATIENT)
Dept: CARDIOLOGY | Facility: CLINIC | Age: 50
End: 2025-08-22
Payer: COMMERCIAL

## 2025-08-22 VITALS
OXYGEN SATURATION: 96 % | WEIGHT: 147 LBS | DIASTOLIC BLOOD PRESSURE: 80 MMHG | HEIGHT: 64 IN | HEART RATE: 100 BPM | SYSTOLIC BLOOD PRESSURE: 160 MMHG | BODY MASS INDEX: 25.1 KG/M2

## 2025-08-22 DIAGNOSIS — R94.31 ABNORMAL ELECTROCARDIOGRAM [ECG] [EKG]: ICD-10-CM

## 2025-08-22 DIAGNOSIS — R03.0 ELEVATED BLOOD-PRESSURE READING, W/OUT DIAGNOSIS OF HYPERTENSION: ICD-10-CM

## 2025-08-22 PROCEDURE — 93000 ELECTROCARDIOGRAM COMPLETE: CPT

## 2025-08-22 PROCEDURE — 99213 OFFICE O/P EST LOW 20 MIN: CPT

## 2025-08-26 ENCOUNTER — APPOINTMENT (OUTPATIENT)
Dept: OBGYN | Facility: CLINIC | Age: 50
End: 2025-08-26
Payer: COMMERCIAL

## 2025-08-26 VITALS
WEIGHT: 130 LBS | HEIGHT: 64 IN | HEART RATE: 108 BPM | SYSTOLIC BLOOD PRESSURE: 165 MMHG | DIASTOLIC BLOOD PRESSURE: 95 MMHG | BODY MASS INDEX: 22.2 KG/M2

## 2025-08-26 PROCEDURE — 99213 OFFICE O/P EST LOW 20 MIN: CPT

## 2025-08-27 LAB
APPEARANCE: CLEAR
BILIRUBIN URINE: NEGATIVE
BLOOD URINE: NEGATIVE
COLOR: YELLOW
GLUCOSE QUALITATIVE U: NEGATIVE MG/DL
KETONES URINE: NEGATIVE MG/DL
LEUKOCYTE ESTERASE URINE: NEGATIVE
NITRITE URINE: NEGATIVE
PH URINE: 6.5
PROTEIN URINE: NEGATIVE MG/DL
SPECIFIC GRAVITY URINE: 1.01
UROBILINOGEN URINE: 0.2 MG/DL

## 2025-09-03 ENCOUNTER — APPOINTMENT (OUTPATIENT)
Dept: ULTRASOUND IMAGING | Facility: CLINIC | Age: 50
End: 2025-09-03
Payer: COMMERCIAL

## 2025-09-03 PROCEDURE — 76830 TRANSVAGINAL US NON-OB: CPT

## 2025-09-03 PROCEDURE — 76775 US EXAM ABDO BACK WALL LIM: CPT

## 2025-09-17 ENCOUNTER — APPOINTMENT (OUTPATIENT)
Dept: UROLOGY | Facility: CLINIC | Age: 50
End: 2025-09-17

## 2025-09-17 ENCOUNTER — NON-APPOINTMENT (OUTPATIENT)
Age: 50
End: 2025-09-17

## 2025-09-17 ENCOUNTER — APPOINTMENT (OUTPATIENT)
Dept: OBGYN | Facility: CLINIC | Age: 50
End: 2025-09-17
Payer: COMMERCIAL

## 2025-09-17 VITALS
HEART RATE: 85 BPM | DIASTOLIC BLOOD PRESSURE: 86 MMHG | BODY MASS INDEX: 25.61 KG/M2 | SYSTOLIC BLOOD PRESSURE: 141 MMHG | HEIGHT: 64 IN | WEIGHT: 150 LBS

## 2025-09-17 VITALS
HEART RATE: 98 BPM | HEIGHT: 64 IN | DIASTOLIC BLOOD PRESSURE: 95 MMHG | BODY MASS INDEX: 25.61 KG/M2 | WEIGHT: 150 LBS | SYSTOLIC BLOOD PRESSURE: 170 MMHG

## 2025-09-17 DIAGNOSIS — R92.30 DENSE BREASTS, UNSPECIFIED: ICD-10-CM

## 2025-09-17 DIAGNOSIS — N76.0 ACUTE VAGINITIS: ICD-10-CM

## 2025-09-17 DIAGNOSIS — Z01.419 ENCOUNTER FOR GYNECOLOGICAL EXAMINATION (GENERAL) (ROUTINE) W/OUT ABNORMAL FINDINGS: ICD-10-CM

## 2025-09-17 DIAGNOSIS — R31.29 OTHER MICROSCOPIC HEMATURIA: ICD-10-CM

## 2025-09-17 DIAGNOSIS — N28.89 OTHER SPECIFIED DISORDERS OF KIDNEY AND URETER: ICD-10-CM

## 2025-09-17 DIAGNOSIS — F41.9 ANXIETY DISORDER, UNSPECIFIED: ICD-10-CM

## 2025-09-17 LAB
BILIRUB UR QL STRIP: NORMAL
CLARITY UR: CLEAR
COLLECTION METHOD: NORMAL
GLUCOSE UR-MCNC: NORMAL
HCG UR QL: 0.2 EU/DL
HGB UR QL STRIP.AUTO: ABNORMAL
KETONES UR-MCNC: NORMAL
LEUKOCYTE ESTERASE UR QL STRIP: NORMAL
NITRITE UR QL STRIP: NORMAL
PH UR STRIP: 5.5
PROT UR STRIP-MCNC: NORMAL
SP GR UR STRIP: 1.02

## 2025-09-17 PROCEDURE — 99396 PREV VISIT EST AGE 40-64: CPT

## 2025-09-17 PROCEDURE — 99459 PELVIC EXAMINATION: CPT

## 2025-09-17 RX ORDER — FLUCONAZOLE 150 MG/1
150 TABLET ORAL
Qty: 2 | Refills: 0 | Status: ACTIVE | COMMUNITY
Start: 2025-09-17 | End: 1900-01-01

## 2025-09-18 LAB — URINE CYTOLOGY: NORMAL

## 2025-09-23 LAB — CYTOLOGY CVX/VAG DOC THIN PREP: NORMAL

## (undated) DEVICE — FORCEP RADIAL JAW 4 240CM DISP

## (undated) DEVICE — VALVE ENDO SURESEAL II 0-5FR